# Patient Record
Sex: FEMALE | URBAN - METROPOLITAN AREA
[De-identification: names, ages, dates, MRNs, and addresses within clinical notes are randomized per-mention and may not be internally consistent; named-entity substitution may affect disease eponyms.]

---

## 2023-11-16 ENCOUNTER — INPATIENT (INPATIENT)
Facility: HOSPITAL | Age: 78
LOS: 5 days | Discharge: HOME | DRG: 517 | End: 2023-11-22
Attending: STUDENT IN AN ORGANIZED HEALTH CARE EDUCATION/TRAINING PROGRAM | Admitting: STUDENT IN AN ORGANIZED HEALTH CARE EDUCATION/TRAINING PROGRAM
Payer: COMMERCIAL

## 2023-11-16 VITALS
HEART RATE: 72 BPM | OXYGEN SATURATION: 99 % | WEIGHT: 115.08 LBS | DIASTOLIC BLOOD PRESSURE: 74 MMHG | RESPIRATION RATE: 18 BRPM | SYSTOLIC BLOOD PRESSURE: 133 MMHG | TEMPERATURE: 99 F | HEIGHT: 63 IN

## 2023-11-16 DIAGNOSIS — M31.6 OTHER GIANT CELL ARTERITIS: ICD-10-CM

## 2023-11-16 LAB
ANION GAP SERPL CALC-SCNC: 13 MMOL/L — SIGNIFICANT CHANGE UP (ref 5–17)
ANION GAP SERPL CALC-SCNC: 13 MMOL/L — SIGNIFICANT CHANGE UP (ref 5–17)
BASOPHILS # BLD AUTO: 0.03 K/UL — SIGNIFICANT CHANGE UP (ref 0–0.2)
BASOPHILS # BLD AUTO: 0.03 K/UL — SIGNIFICANT CHANGE UP (ref 0–0.2)
BASOPHILS NFR BLD AUTO: 0.4 % — SIGNIFICANT CHANGE UP (ref 0–2)
BASOPHILS NFR BLD AUTO: 0.4 % — SIGNIFICANT CHANGE UP (ref 0–2)
BUN SERPL-MCNC: 26 MG/DL — HIGH (ref 7–23)
BUN SERPL-MCNC: 26 MG/DL — HIGH (ref 7–23)
CALCIUM SERPL-MCNC: 10.1 MG/DL — SIGNIFICANT CHANGE UP (ref 8.4–10.5)
CALCIUM SERPL-MCNC: 10.1 MG/DL — SIGNIFICANT CHANGE UP (ref 8.4–10.5)
CHLORIDE SERPL-SCNC: 102 MMOL/L — SIGNIFICANT CHANGE UP (ref 96–108)
CHLORIDE SERPL-SCNC: 102 MMOL/L — SIGNIFICANT CHANGE UP (ref 96–108)
CO2 SERPL-SCNC: 24 MMOL/L — SIGNIFICANT CHANGE UP (ref 22–31)
CO2 SERPL-SCNC: 24 MMOL/L — SIGNIFICANT CHANGE UP (ref 22–31)
CREAT SERPL-MCNC: 0.85 MG/DL — SIGNIFICANT CHANGE UP (ref 0.5–1.3)
CREAT SERPL-MCNC: 0.85 MG/DL — SIGNIFICANT CHANGE UP (ref 0.5–1.3)
CRP SERPL-MCNC: 19 MG/L — HIGH (ref 0–4)
CRP SERPL-MCNC: 19 MG/L — HIGH (ref 0–4)
EGFR: 70 ML/MIN/1.73M2 — SIGNIFICANT CHANGE UP
EGFR: 70 ML/MIN/1.73M2 — SIGNIFICANT CHANGE UP
EOSINOPHIL # BLD AUTO: 0.09 K/UL — SIGNIFICANT CHANGE UP (ref 0–0.5)
EOSINOPHIL # BLD AUTO: 0.09 K/UL — SIGNIFICANT CHANGE UP (ref 0–0.5)
EOSINOPHIL NFR BLD AUTO: 1.2 % — SIGNIFICANT CHANGE UP (ref 0–6)
EOSINOPHIL NFR BLD AUTO: 1.2 % — SIGNIFICANT CHANGE UP (ref 0–6)
GLUCOSE SERPL-MCNC: 114 MG/DL — HIGH (ref 70–99)
GLUCOSE SERPL-MCNC: 114 MG/DL — HIGH (ref 70–99)
HCT VFR BLD CALC: 32.2 % — LOW (ref 34.5–45)
HCT VFR BLD CALC: 32.2 % — LOW (ref 34.5–45)
HGB BLD-MCNC: 10.3 G/DL — LOW (ref 11.5–15.5)
HGB BLD-MCNC: 10.3 G/DL — LOW (ref 11.5–15.5)
IMM GRANULOCYTES NFR BLD AUTO: 0.6 % — SIGNIFICANT CHANGE UP (ref 0–0.9)
IMM GRANULOCYTES NFR BLD AUTO: 0.6 % — SIGNIFICANT CHANGE UP (ref 0–0.9)
LYMPHOCYTES # BLD AUTO: 1.05 K/UL — SIGNIFICANT CHANGE UP (ref 1–3.3)
LYMPHOCYTES # BLD AUTO: 1.05 K/UL — SIGNIFICANT CHANGE UP (ref 1–3.3)
LYMPHOCYTES # BLD AUTO: 14.6 % — SIGNIFICANT CHANGE UP (ref 13–44)
LYMPHOCYTES # BLD AUTO: 14.6 % — SIGNIFICANT CHANGE UP (ref 13–44)
MAGNESIUM SERPL-MCNC: 1.8 MG/DL — SIGNIFICANT CHANGE UP (ref 1.6–2.6)
MAGNESIUM SERPL-MCNC: 1.8 MG/DL — SIGNIFICANT CHANGE UP (ref 1.6–2.6)
MCHC RBC-ENTMCNC: 30.9 PG — SIGNIFICANT CHANGE UP (ref 27–34)
MCHC RBC-ENTMCNC: 30.9 PG — SIGNIFICANT CHANGE UP (ref 27–34)
MCHC RBC-ENTMCNC: 32 GM/DL — SIGNIFICANT CHANGE UP (ref 32–36)
MCHC RBC-ENTMCNC: 32 GM/DL — SIGNIFICANT CHANGE UP (ref 32–36)
MCV RBC AUTO: 96.7 FL — SIGNIFICANT CHANGE UP (ref 80–100)
MCV RBC AUTO: 96.7 FL — SIGNIFICANT CHANGE UP (ref 80–100)
MONOCYTES # BLD AUTO: 0.55 K/UL — SIGNIFICANT CHANGE UP (ref 0–0.9)
MONOCYTES # BLD AUTO: 0.55 K/UL — SIGNIFICANT CHANGE UP (ref 0–0.9)
MONOCYTES NFR BLD AUTO: 7.6 % — SIGNIFICANT CHANGE UP (ref 2–14)
MONOCYTES NFR BLD AUTO: 7.6 % — SIGNIFICANT CHANGE UP (ref 2–14)
NEUTROPHILS # BLD AUTO: 5.45 K/UL — SIGNIFICANT CHANGE UP (ref 1.8–7.4)
NEUTROPHILS # BLD AUTO: 5.45 K/UL — SIGNIFICANT CHANGE UP (ref 1.8–7.4)
NEUTROPHILS NFR BLD AUTO: 75.6 % — SIGNIFICANT CHANGE UP (ref 43–77)
NEUTROPHILS NFR BLD AUTO: 75.6 % — SIGNIFICANT CHANGE UP (ref 43–77)
NRBC # BLD: 0 /100 WBCS — SIGNIFICANT CHANGE UP (ref 0–0)
NRBC # BLD: 0 /100 WBCS — SIGNIFICANT CHANGE UP (ref 0–0)
PLATELET # BLD AUTO: 308 K/UL — SIGNIFICANT CHANGE UP (ref 150–400)
PLATELET # BLD AUTO: 308 K/UL — SIGNIFICANT CHANGE UP (ref 150–400)
POTASSIUM SERPL-MCNC: 4.5 MMOL/L — SIGNIFICANT CHANGE UP (ref 3.5–5.3)
POTASSIUM SERPL-MCNC: 4.5 MMOL/L — SIGNIFICANT CHANGE UP (ref 3.5–5.3)
POTASSIUM SERPL-SCNC: 4.5 MMOL/L — SIGNIFICANT CHANGE UP (ref 3.5–5.3)
POTASSIUM SERPL-SCNC: 4.5 MMOL/L — SIGNIFICANT CHANGE UP (ref 3.5–5.3)
RBC # BLD: 3.33 M/UL — LOW (ref 3.8–5.2)
RBC # BLD: 3.33 M/UL — LOW (ref 3.8–5.2)
RBC # FLD: 15.4 % — HIGH (ref 10.3–14.5)
RBC # FLD: 15.4 % — HIGH (ref 10.3–14.5)
SODIUM SERPL-SCNC: 139 MMOL/L — SIGNIFICANT CHANGE UP (ref 135–145)
SODIUM SERPL-SCNC: 139 MMOL/L — SIGNIFICANT CHANGE UP (ref 135–145)
WBC # BLD: 7.21 K/UL — SIGNIFICANT CHANGE UP (ref 3.8–10.5)
WBC # BLD: 7.21 K/UL — SIGNIFICANT CHANGE UP (ref 3.8–10.5)
WBC # FLD AUTO: 7.21 K/UL — SIGNIFICANT CHANGE UP (ref 3.8–10.5)
WBC # FLD AUTO: 7.21 K/UL — SIGNIFICANT CHANGE UP (ref 3.8–10.5)

## 2023-11-16 PROCEDURE — 99285 EMERGENCY DEPT VISIT HI MDM: CPT

## 2023-11-16 RX ORDER — SODIUM CHLORIDE 9 MG/ML
500 INJECTION INTRAMUSCULAR; INTRAVENOUS; SUBCUTANEOUS ONCE
Refills: 0 | Status: COMPLETED | OUTPATIENT
Start: 2023-11-16 | End: 2023-11-16

## 2023-11-16 RX ADMIN — SODIUM CHLORIDE 500 MILLILITER(S): 9 INJECTION INTRAMUSCULAR; INTRAVENOUS; SUBCUTANEOUS at 21:33

## 2023-11-16 RX ADMIN — Medication 50 MILLIGRAM(S): at 21:33

## 2023-11-16 NOTE — ED PROVIDER NOTE - OBJECTIVE STATEMENT
HPI & ROS: 70-year-old female with a history of hypertension, hyperlipidemia coming in with weeks of not changing temporal bilateral headache.  Taking Tylenol and Motrin for this.  In the outpatient setting, her neurology physician was concern for GCA,  difficulties in scheduling a biopsy.  ESR and CRP resulted from blood work done at the primary care office with an ESR of 90 and a CRP of around 16, was sent here and neurology in-house apparently aware of the patient.  No fevers no chills.  Patient having arthralgias myalgias.  No cough no congestion.  No chest pain no shortness of breath.  Has been losing weight as well.    Neuro: orville jacobson

## 2023-11-16 NOTE — ED PROVIDER NOTE - PROGRESS NOTE DETAILS
NAYELI Jones PGY-2: Neurology will see patient.  Ophthalmology will see the patient after going to [facility] for possible operative intervention due to trauma there.  He will sign out the patient and they will get here soon as possible, likely after 10 PM.

## 2023-11-16 NOTE — ED PROVIDER NOTE - PHYSICAL EXAMINATION
Exam as stated below:   CONSTITUTIONAL: In NAD.   SKIN: Warm dry. No rashes noted.   EYES: No scleral icterus. Conjunctiva pink.  ENT:   Noted raised right temporal artery, pulsatile.  Tender to palpation.  Left temporal artery tender to palpation though not as noticeable as right.  CARD: RRR. No murmurs.  RESP: Clear to ausculation b/l. No Crackles noted. No Wheezing noted.  ABD: Soft. Non-tender. Not distended.   MSK: No pedal edema. No calf tenderness.  NEURO: UE/LE grossly intact. Motor UE/LE sensation grossly intact. CN II-XII grossly intact.   PSYCH: Cooperative, appropriate. Exam as stated below:   CONSTITUTIONAL: In NAD.   SKIN: Warm dry. No rashes noted.   EYES: No scleral icterus. Conjunctiva pink. L:20/40 R: 40/70   ENT:   Noted raised right temporal artery, pulsatile.  Tender to palpation.  Left temporal artery tender to palpation though not as noticeable as right.  CARD: RRR. No murmurs.  RESP: Clear to ausculation b/l. No Crackles noted. No Wheezing noted.  ABD: Soft. Non-tender. Not distended.   MSK: No pedal edema. No calf tenderness.  NEURO: UE/LE grossly intact. Motor UE/LE sensation grossly intact. CN II-XII grossly intact.   PSYCH: Cooperative, appropriate.

## 2023-11-16 NOTE — ED ADULT TRIAGE NOTE - CHIEF COMPLAINT QUOTE
seen by neurologist today; sent in by MD due to abnormal lab results of elevated ESR and CRP; c/o headaches and blurred vision since Monday; sent in by Neurologist for concern for impending vision loss

## 2023-11-16 NOTE — ED PROVIDER NOTE - NS ED MD DISPO ADMITTING SERVICE
Discussion/Summary   Will discuss with you at appt on 12/13/17.    LAD        Verified Results  TSH WITH REFLEX 28Nov2017 12:32PM MIRNA PARTIDA     Test Name Result Flag Reference   TSH 2.881 mcUnits/mL  0.350-5.000   Findings most consistent with euthyroid state, no additional testing suggested. TSH may be normal in patients with thyroid dysfunction and pituitary disease. Clinical correlation recommended.  (Reflex TSH algorithm is not recommended in hospitalized patients. A variety of drugs, as well as serious acute and chronic illnesses may alter thyroid function tests. Commonly implicated drugs include glucocorticoids, dopamine, carbamazepine, iodine, amiodarone, lithium and heparin.)       Message   CC:  Dr Bettye Rucker     
MED

## 2023-11-16 NOTE — ED PROVIDER NOTE - CARE PLAN
Principal Discharge DX:	GCA (giant cell arteritis)   1 Principal Discharge DX:	Blurred vision  Secondary Diagnosis:	Daily headache  Secondary Diagnosis:	Elevated sedimentation rate

## 2023-11-16 NOTE — CONSULT NOTE ADULT - SUBJECTIVE AND OBJECTIVE BOX
Neurology - Consult Note    -  Spectra: [phone] ([facility]), [phone] ([facility]). For new consults, please page: [phone] ([facility]), [phone] ([facility]).  -    HPI: Patient PATTI HARRIS is a 78y (1945) *** handed wo/man who presented to *** ED on ***, with c/o ***.    PMH significant for: ***    Review of Systems:  INCOMPLETE   All other review of systems is negative unless indicated above.    Allergies:  No Known Allergies      PMHx/PSHx/Family Hx: As above, otherwise see below       Social Hx:  No current use of tobacco, alcohol, or illicit drugs  Lives with ***    Medications:  MEDICATIONS  (STANDING):    MEDICATIONS  (PRN):      Vitals:  T(C): 37.2 (11-16-23 @ 19:43), Max: 37.2 (11-16-23 @ 19:43)  HR: 72 (11-16-23 @ 19:43) (72 - 72)  BP: 133/74 (11-16-23 @ 19:43) (133/74 - 133/74)  RR: 18 (11-16-23 @ 19:43) (18 - 18)  SpO2: 99% (11-16-23 @ 19:43) (99% - 99%)    Physical Examination: INCOMPLETE  General - Sitting up on ED cart  Cardiovascular - No LE edema  Eyes - Fundoscopy not performed due to safety precautions in the setting of infection risk, non-injected conjunctiva, anicteric sclera    Neurologic Exam:  Mental status:  - Awake, Alert  - Oriented to: person, place, and time  - Speech: fluent  - Repetition and naming: intact   - Follows simple and complex commands   - Attention/concentration: intact  - Recent and remote memory (including registration and recall): registration intact, 3/3 on 3-word recall  - Fund of knowledge: intact    Cranial nerves - PERRL, VFF on confrontational testing, EOMI - no nystagmus, face sensation (V1-V3) intact b/l, facial strength intact without asymmetry b/l, hearing intact b/l with finger rub test, palate with symmetric elevation, trapezius 5/5 strength b/l, tongue midline on protrusion with full lateral movement    Motor - Normal bulk and tone throughout. No pronator drift.  Strength testing (R/L)  Deltoid:  5/5  Biceps:  5/5        Triceps:  5/5       Wrist Extension:  5/5      Wrist Flexion:  5/5       Interossei:  5/5        :  5/5    Hip Flexion:  5/5  Hip Extension:  5/5      Knee Flexion:  5/5      Knee Extension:  5/5      Dorsiflexion:  5/5      Plantar Flexion:  5/5    Sensation - Light touch/vibration intact throughout    DTRs (R/L)  Biceps:  2+/2+        Triceps:  2+/2+       Brachioradialis:  2+/2+        Patellar:  2+/2+      Ankle:  2+/2+      Plantar response:  Down/Down    Coordination - Finger to Nose intact b/l. No tremors appreciated.    Gait and station - Did not assess d/t fall risk/safety concerns.    Labs:          CAPILLARY BLOOD GLUCOSE              CSF:                  Radiology:     Neurology - Consult Note    -  Spectra: [phone] ([facility]), [phone] ([facility]). For new consults, please page: [phone] ([facility]), [phone] ([facility]).  -    HPI: Patient PATTI HARRIS is a 78y (1945) RIGHT handed woman who presented to [facility] ED on 11/16/2023, at the behest of her neurologist, Dr. Fallon, with c/o [place] and blurred vision x 1 month.    PMH significant for: cervical cancer (s/p hysterectomy), HTN, HLD    Patient speaks Georgian. Daughter, bedside, is translating. Patient is a very poor historian and struggles to answer questions - even when asked in Georgian. Daughter reports that patient has been confused for 6 to 12 months. She has c/f dementia.    Patient has been having a headache that involves the crown of her head as well as the temples for 1 month.  Patient has also been complaining of blurry vision in both of her eyes, with the right eye being worse than left eye.  Per daughter patient was seeing Dr. Fallon as an outpatient.  She was prescribed prednisone, but was unable to tolerate it due to gastric side effects.  Daughter reports that patient's symptoms have largely subsided, however her inflammatory markers continue to rise, while she has been off the prednisone.  She also reports the patient has had a poor appetite and is losing weight.  Patient took her prednisone for about 10 days before discontinuing.  Patient does not drink a significant amount of caffeine.  Daughter reports she drinks maybe 1 cup of coffee a day.  Daughter denies that patient has had any head trauma.  Patient does not have a history of ocular issues other than cataract surgery.    Patient reports that she has never had these symptoms before.  She does have a history of cervical cancer which was treated with hysterectomy.  She also has a history of hypertension for which she takes losartan.  Patient also has a history of hyperlipidemia.  Patient denies a history of stroke or MI.  Patient does not take any blood thinners and is unable to tolerate aspirin due to her hiatal hernia.  Patient denies use of assistive devices, is able to climb stairs, does not drive.  Patient denies use of tobacco, alcohol, recreational drugs.  Patient currently lives alone.  Patient is a former hairdresser.  Patient takes famotidine for her GERD.  She does not take it every day.    I had originally suggested to the emergency department provider to order CT imaging in the emergency room to rule out any acute intracranial pathology.  However, when discussing additional CT scans in the emergency department with patient and her daughter, patient declines any further imaging in the emergency room.  Patient's daughter reports that when patient's symptoms were at their worst patient underwent both CT scans and an MRI.  Both were reportedly unremarkable.  Patient daughter does show me her MRI report from [facility].  MRI is only notable for chronic microvascular disease.    Review of Systems:  All other review of systems is negative unless indicated above.    Allergies:  No Known Allergies      PMHx/PSHx/Family Hx: As above, otherwise see below       Social Hx:  Per HPI    Medications:  MEDICATIONS  (STANDING):    MEDICATIONS  (PRN):      Vitals:  T(C): 37.2 (11-16-23 @ 19:43), Max: 37.2 (11-16-23 @ 19:43)  HR: 72 (11-16-23 @ 19:43) (72 - 72)  BP: 133/74 (11-16-23 @ 19:43) (133/74 - 133/74)  RR: 18 (11-16-23 @ 19:43) (18 - 18)  SpO2: 99% (11-16-23 @ 19:43) (99% - 99%)    Physical Examination:  General - Lying supine on ED cart  Cardiovascular - No LE edema, symmetrical temporal pulses on palpation  Eyes - Fundoscopy was performed by opthalmology prior to my examination    Neurologic Exam:  Mental status:  - Awake, Alert  - Oriented to: person. Knows she is in the hospital but does not know which. Knows it is November 2023. Guesses correctly that the date is the 17th.  - Speech: fluent  - Repetition and naming: intact   - Follows simple and complex commands   - Attention/concentration: Declines to spell WORLD forward or backward (even in Georgian)  - Recent and remote memory (including registration and recall): registration intact, 3/3 on 3-word recall  - Fund of knowledge: impaired - does not know the president of the [place], does know [place] conflict in the news    Cranial nerves - Pupils are pharmacologically dilated, VFF on confrontational testing, EOMI - no nystagmus, face sensation (V1-V3) intact b/l, facial strength intact without asymmetry b/l, hearing intact b/l with finger rub test, palate with symmetric elevation, trapezius 5/5 strength b/l, tongue midline on protrusion with full lateral movement    Motor - Normal bulk and tone throughout. No pronator drift.  Strength testing (R/L)  Deltoid:  5/5  Biceps:  5/5        Triceps:  5/5       Wrist Extension:  5/5      Wrist Flexion:  5/5       Interossei:  5/5        :  5/5    Hip Flexion:  5/5  Hip Extension:  5/5      Knee Flexion:  5/5      Knee Extension:  5/5      Dorsiflexion:  5/5      Plantar Flexion:  5/5    Sensation - Light touch/vibration grossly intact throughout    DTRs (R/L)  Biceps:  2+/2+        Triceps:  2+/2+       Brachioradialis:  2+/2+        Patellar:  2+/2+      Ankle:  2+/2+      Plantar response:  TAVO - patient with shoes on, not able to easily remove    Coordination - Finger to Nose intact b/l. No tremors appreciated.    Gait and station - Did not assess d/t fall risk/safety concerns.    Labs:          CAPILLARY BLOOD GLUCOSE              CSF:                  Radiology:  Patient declined scans in the ED.

## 2023-11-16 NOTE — ED PROVIDER NOTE - CLINICAL SUMMARY MEDICAL DECISION MAKING FREE TEXT BOX
MDM/Summary/DDx (includes but is not limited to):   This is a 70-year-old female with a history of temporal bilateral headache, with concern for GCA, found to have a pulsatile right  tender to palpation artery.  With an ESR in the office of over 50, diagnosis could be GCA versus other vasculitis though nothing found on my cardiac exam.  Labs: cbc bmp esr crp   Imaging: none   Tx: Supportive, pain/nausea medications as pt requires/requests. iv steroids   Consults/Resources: [id number] (aware at 2110)  Dispo: admit     Triage note reviewed. VS reviewed. EKG reviewed and documented in "RESULTS" section, if possible at given time.     DDx in MDM includes the most likely ddx, but is not limited to solely what is listed. Clinical course may alter/deviate from the above plan. When possible, progress notes written, as needed, and are included in "PROGRESS NOTE" section below.       Medical, family, and social determinants of health reviewed and discussed w/ pt/family/caretaker, when allowable, and is incorporated into note above, whenever possible.

## 2023-11-16 NOTE — ED PROVIDER NOTE - ATTENDING CONTRIBUTION TO CARE
Attending MD Fernandes:  I personally have seen and examined this patient. I have performed a substantive portion of the visit including all aspects of the medical decision making.  Resident note reviewed and agree on plan of care and except where noted.      78-year-old woman presenting at the recommendation of her neurologist for evaluation of 1 month of global headache pain and now several days of bilateral blurred vision.  Reportedly elevated ESR/CRP of 90 and 16 as outpatient.  Patient reports being on prednisone 3 to 4 weeks prior for 10 days however she cannot tolerate it due to abdominal discomfort.  Denies any jaw claudication, no fevers or chills.  Did admit to 5 to 10 pound weight loss.  No rash no abdominal pain no chest pain or shortness of breath.    Vital signs are nonactionable.  Awake and alert. Symmetric eyebrow raise, symmetric eyelid closure. PERRL b/l, EOMI b/l, symmetric smile, tongue midline.  5/5  strength bilaterally, 5/5 elbow flexion bilaterally, 5/5 elbow extension bilaterally, 5/5 shoulder shrug b/l.  5/5 plantar and dorsiflexion b/l, 5/5 knee flexion and extension b/l, 5/5 hip flexion and extension b/l. Sensation intact and symmetric grossly to light touch throughout face and bilateral upper and lower extremities,  Finger to nose normal bilaterally. Steady gait.  Bilateral temporal tenderness.  Visual acuity 20/50 OD, 20/70 OS    Patient presenting for evaluation of 1 month of headache pain and now several days of blurred vision with reportedly elevated ESR/CRP.  Symptom complex concerning for GCA, given visual changes will initiate pulse dose steroids, neurology and ophthalmology are consulted and will see patient within the hospital.  Patient had an MRI of the brain on 10/27 with report provided to me that did not show any structural lesions bleeding or any other acute findings.  Repeat neuroimaging would not be of benefit in this patient unless neurology recommends       *The above represents an initial assessment/impression. Please refer to progress notes for potential changes in patient clinical course*

## 2023-11-16 NOTE — ED ADULT NURSE NOTE - OBJECTIVE STATEMENT
77 yo F pt P/W a few months of HA, fatigue and decreased appetite and blurry vision over the past 3 days. pt sent by her Neurologist as her ESR and CRP were elevated.  pt is A&Ox4, MAEW, no unilateral drift/weakness, no facial droop, PERRL, no ataxia, gait grossly normal.  Pt denies: dizziness, chest pain, palpitations, cough, SOB, abdominal pain, n/v/d, urinary symptoms, fevers, chills, weakness at this time.

## 2023-11-16 NOTE — CONSULT NOTE ADULT - ASSESSMENT
#Headache  #Blurred Vision  #Elevated ESR, CRP    Vitals notable for: /74, HR 72, RR 18, SpO2 99% on RA. Labs notable for: ***. Exam notable for ***.     Impression: GCA by history    Recommendations:  [] Draw ESR, CRP in the ED  [] Administer methylprednisolone 1000mg IV in the ED, continue x 3 days, then discharge on prednisone 1 mg/kg/day (unless Rheumatology says otherwise)  [] Admit to Medicine  [] Please consult Rheumatology, Ophthalmology - appreciate evaluation and recommendations  [] May require temporal artery biopsy - given steroid use, may return nondiagnostic    To be seen by attending in the AM with attestation to follow. Plan is not formalized until attending attestation is complete. Recommendations were relayed directly to ***. Note delayed d/t emergent patient care. #Headache - b/l temporal and crown  #Blurred Vision  #Elevated ESR, CRP  #Glaucoma   #Macular Degeneration    Vitals notable for: /74, HR 72, RR 18, SpO2 99% on RA. Labs notable for: ***. Exam notable for ***.     Impression: GCA by history    Recommendations:  [] Draw ESR, CRP in the ED  [] Patient declined CT imaging in the ED (NCCTH, CTA, CTV). Daughter reports she had CT and MRI when she had her worst symptoms and they returned unremarkable. MRI report reviewed - chronic microvascular disease  [] Administer methylprednisolone 1000mg IV in the ED, continue x 3 days, then discharge on prednisone 1 mg/kg/day (unless Rheumatology says otherwise - patient apparently not able to tolerate prednisone d/t upset stomach)  [] Please start pantoprazole 40mg daily while patient on steroids - intermittently takes famotidine for GERD, sensitive stomach  [] Admit to Medicine  [] Please consult Rheumatology, Ophthalmology - appreciate evaluation and recommendations  [] May require temporal artery biopsy - given steroid use, may return nondiagnostic    To be seen by attending in the AM with attestation to follow. Plan is not formalized until attending attestation is complete. Recommendations were relayed directly to ***. Note delayed d/t emergent patient care. #Headache - b/l temporal and crown  #Blurred Vision  #Elevated ESR, CRP  #Glaucoma   #Macular Degeneration  #Suspected Cognitive Impairment    Vitals notable for: /74, HR 72, RR 18, SpO2 99% on RA. Labs notable for: ESR 75, CRP 19, Hg 10.3, BUN 26, glucose 114. Exam notable for poor STM (0/3 on 3-word recall, even with clues).     Impression: GCA by history    Recommendations:  [] Draw ESR, CRP in the ED  [] Patient declined CT imaging in the ED (NCCTH, CTA, CTV). Daughter reports she had CT and MRI when she had her worst symptoms and they returned unremarkable. MRI report from [facility] reviewed - chronic microvascular disease.  [] Administer methylprednisolone 1000mg IV in the ED, continue x 3 days, then discharge on prednisone 1 mg/kg/day (unless Rheumatology says otherwise - patient apparently not able to tolerate prednisone d/t upset stomach)  [] Please start pantoprazole 40mg daily while patient on steroids - intermittently takes famotidine for GERD, sensitive stomach  [] Admit to Medicine  [] Please consult Rheumatology, Ophthalmology - appreciate evaluation and recommendations  [] May require temporal artery biopsy (defer to Rheumatology) - given steroid use, may return nondiagnostic    To be seen by attending in the AM with attestation to follow. Plan is not formalized until attending attestation is complete. Note delayed d/t emergent patient care.

## 2023-11-17 DIAGNOSIS — I10 ESSENTIAL (PRIMARY) HYPERTENSION: ICD-10-CM

## 2023-11-17 DIAGNOSIS — E78.5 HYPERLIPIDEMIA, UNSPECIFIED: ICD-10-CM

## 2023-11-17 DIAGNOSIS — Z29.9 ENCOUNTER FOR PROPHYLACTIC MEASURES, UNSPECIFIED: ICD-10-CM

## 2023-11-17 DIAGNOSIS — H35.30 UNSPECIFIED MACULAR DEGENERATION: ICD-10-CM

## 2023-11-17 DIAGNOSIS — M31.6 OTHER GIANT CELL ARTERITIS: ICD-10-CM

## 2023-11-17 DIAGNOSIS — H40.9 UNSPECIFIED GLAUCOMA: ICD-10-CM

## 2023-11-17 LAB
ALBUMIN SERPL ELPH-MCNC: 3.5 G/DL — SIGNIFICANT CHANGE UP (ref 3.3–5)
ALBUMIN SERPL ELPH-MCNC: 3.5 G/DL — SIGNIFICANT CHANGE UP (ref 3.3–5)
ALP SERPL-CCNC: 89 U/L — SIGNIFICANT CHANGE UP (ref 40–120)
ALP SERPL-CCNC: 89 U/L — SIGNIFICANT CHANGE UP (ref 40–120)
ALT FLD-CCNC: 9 U/L — LOW (ref 10–45)
ALT FLD-CCNC: 9 U/L — LOW (ref 10–45)
ANION GAP SERPL CALC-SCNC: 16 MMOL/L — SIGNIFICANT CHANGE UP (ref 5–17)
ANION GAP SERPL CALC-SCNC: 16 MMOL/L — SIGNIFICANT CHANGE UP (ref 5–17)
AST SERPL-CCNC: 18 U/L — SIGNIFICANT CHANGE UP (ref 10–40)
AST SERPL-CCNC: 18 U/L — SIGNIFICANT CHANGE UP (ref 10–40)
BILIRUB SERPL-MCNC: 0.4 MG/DL — SIGNIFICANT CHANGE UP (ref 0.2–1.2)
BILIRUB SERPL-MCNC: 0.4 MG/DL — SIGNIFICANT CHANGE UP (ref 0.2–1.2)
BUN SERPL-MCNC: 24 MG/DL — HIGH (ref 7–23)
BUN SERPL-MCNC: 24 MG/DL — HIGH (ref 7–23)
CALCIUM SERPL-MCNC: 9.7 MG/DL — SIGNIFICANT CHANGE UP (ref 8.4–10.5)
CALCIUM SERPL-MCNC: 9.7 MG/DL — SIGNIFICANT CHANGE UP (ref 8.4–10.5)
CHLORIDE SERPL-SCNC: 104 MMOL/L — SIGNIFICANT CHANGE UP (ref 96–108)
CHLORIDE SERPL-SCNC: 104 MMOL/L — SIGNIFICANT CHANGE UP (ref 96–108)
CO2 SERPL-SCNC: 21 MMOL/L — LOW (ref 22–31)
CO2 SERPL-SCNC: 21 MMOL/L — LOW (ref 22–31)
CREAT SERPL-MCNC: 0.81 MG/DL — SIGNIFICANT CHANGE UP (ref 0.5–1.3)
CREAT SERPL-MCNC: 0.81 MG/DL — SIGNIFICANT CHANGE UP (ref 0.5–1.3)
EGFR: 74 ML/MIN/1.73M2 — SIGNIFICANT CHANGE UP
EGFR: 74 ML/MIN/1.73M2 — SIGNIFICANT CHANGE UP
ERYTHROCYTE [SEDIMENTATION RATE] IN BLOOD: 75 MM/HR — HIGH (ref 0–20)
ERYTHROCYTE [SEDIMENTATION RATE] IN BLOOD: 75 MM/HR — HIGH (ref 0–20)
GLUCOSE SERPL-MCNC: 176 MG/DL — HIGH (ref 70–99)
GLUCOSE SERPL-MCNC: 176 MG/DL — HIGH (ref 70–99)
HCT VFR BLD CALC: 30.4 % — LOW (ref 34.5–45)
HCT VFR BLD CALC: 30.4 % — LOW (ref 34.5–45)
HCV AB S/CO SERPL IA: 0.04 S/CO — SIGNIFICANT CHANGE UP (ref 0–0.99)
HCV AB S/CO SERPL IA: 0.04 S/CO — SIGNIFICANT CHANGE UP (ref 0–0.99)
HCV AB SERPL-IMP: SIGNIFICANT CHANGE UP
HCV AB SERPL-IMP: SIGNIFICANT CHANGE UP
HGB BLD-MCNC: 10 G/DL — LOW (ref 11.5–15.5)
HGB BLD-MCNC: 10 G/DL — LOW (ref 11.5–15.5)
MAGNESIUM SERPL-MCNC: 1.7 MG/DL — SIGNIFICANT CHANGE UP (ref 1.6–2.6)
MAGNESIUM SERPL-MCNC: 1.7 MG/DL — SIGNIFICANT CHANGE UP (ref 1.6–2.6)
MCHC RBC-ENTMCNC: 31.3 PG — SIGNIFICANT CHANGE UP (ref 27–34)
MCHC RBC-ENTMCNC: 31.3 PG — SIGNIFICANT CHANGE UP (ref 27–34)
MCHC RBC-ENTMCNC: 32.9 GM/DL — SIGNIFICANT CHANGE UP (ref 32–36)
MCHC RBC-ENTMCNC: 32.9 GM/DL — SIGNIFICANT CHANGE UP (ref 32–36)
MCV RBC AUTO: 95 FL — SIGNIFICANT CHANGE UP (ref 80–100)
MCV RBC AUTO: 95 FL — SIGNIFICANT CHANGE UP (ref 80–100)
NRBC # BLD: 0 /100 WBCS — SIGNIFICANT CHANGE UP (ref 0–0)
NRBC # BLD: 0 /100 WBCS — SIGNIFICANT CHANGE UP (ref 0–0)
PHOSPHATE SERPL-MCNC: 3.9 MG/DL — SIGNIFICANT CHANGE UP (ref 2.5–4.5)
PHOSPHATE SERPL-MCNC: 3.9 MG/DL — SIGNIFICANT CHANGE UP (ref 2.5–4.5)
PLATELET # BLD AUTO: 309 K/UL — SIGNIFICANT CHANGE UP (ref 150–400)
PLATELET # BLD AUTO: 309 K/UL — SIGNIFICANT CHANGE UP (ref 150–400)
POTASSIUM SERPL-MCNC: 4.7 MMOL/L — SIGNIFICANT CHANGE UP (ref 3.5–5.3)
POTASSIUM SERPL-MCNC: 4.7 MMOL/L — SIGNIFICANT CHANGE UP (ref 3.5–5.3)
POTASSIUM SERPL-SCNC: 4.7 MMOL/L — SIGNIFICANT CHANGE UP (ref 3.5–5.3)
POTASSIUM SERPL-SCNC: 4.7 MMOL/L — SIGNIFICANT CHANGE UP (ref 3.5–5.3)
PROT SERPL-MCNC: 6.5 G/DL — SIGNIFICANT CHANGE UP (ref 6–8.3)
PROT SERPL-MCNC: 6.5 G/DL — SIGNIFICANT CHANGE UP (ref 6–8.3)
RBC # BLD: 3.2 M/UL — LOW (ref 3.8–5.2)
RBC # BLD: 3.2 M/UL — LOW (ref 3.8–5.2)
RBC # FLD: 15.2 % — HIGH (ref 10.3–14.5)
RBC # FLD: 15.2 % — HIGH (ref 10.3–14.5)
SODIUM SERPL-SCNC: 141 MMOL/L — SIGNIFICANT CHANGE UP (ref 135–145)
SODIUM SERPL-SCNC: 141 MMOL/L — SIGNIFICANT CHANGE UP (ref 135–145)
WBC # BLD: 9.01 K/UL — SIGNIFICANT CHANGE UP (ref 3.8–10.5)
WBC # BLD: 9.01 K/UL — SIGNIFICANT CHANGE UP (ref 3.8–10.5)
WBC # FLD AUTO: 9.01 K/UL — SIGNIFICANT CHANGE UP (ref 3.8–10.5)
WBC # FLD AUTO: 9.01 K/UL — SIGNIFICANT CHANGE UP (ref 3.8–10.5)

## 2023-11-17 PROCEDURE — 99223 1ST HOSP IP/OBS HIGH 75: CPT | Mod: GC

## 2023-11-17 PROCEDURE — 99222 1ST HOSP IP/OBS MODERATE 55: CPT | Mod: GC

## 2023-11-17 PROCEDURE — 99233 SBSQ HOSP IP/OBS HIGH 50: CPT

## 2023-11-17 RX ORDER — LATANOPROST 0.05 MG/ML
1 SOLUTION/ DROPS OPHTHALMIC; TOPICAL AT BEDTIME
Refills: 0 | Status: DISCONTINUED | OUTPATIENT
Start: 2023-11-17 | End: 2023-11-20

## 2023-11-17 RX ORDER — ENOXAPARIN SODIUM 100 MG/ML
40 INJECTION SUBCUTANEOUS EVERY 24 HOURS
Refills: 0 | Status: DISCONTINUED | OUTPATIENT
Start: 2023-11-17 | End: 2023-11-20

## 2023-11-17 RX ORDER — LANOLIN ALCOHOL/MO/W.PET/CERES
3 CREAM (GRAM) TOPICAL AT BEDTIME
Refills: 0 | Status: DISCONTINUED | OUTPATIENT
Start: 2023-11-17 | End: 2023-11-20

## 2023-11-17 RX ORDER — ONDANSETRON 8 MG/1
4 TABLET, FILM COATED ORAL EVERY 8 HOURS
Refills: 0 | Status: DISCONTINUED | OUTPATIENT
Start: 2023-11-17 | End: 2023-11-20

## 2023-11-17 RX ORDER — ACETAMINOPHEN 500 MG
650 TABLET ORAL EVERY 6 HOURS
Refills: 0 | Status: DISCONTINUED | OUTPATIENT
Start: 2023-11-17 | End: 2023-11-20

## 2023-11-17 RX ORDER — PANTOPRAZOLE SODIUM 20 MG/1
40 TABLET, DELAYED RELEASE ORAL
Refills: 0 | Status: DISCONTINUED | OUTPATIENT
Start: 2023-11-17 | End: 2023-11-20

## 2023-11-17 RX ADMIN — PANTOPRAZOLE SODIUM 40 MILLIGRAM(S): 20 TABLET, DELAYED RELEASE ORAL at 06:48

## 2023-11-17 RX ADMIN — Medication 50 MILLIGRAM(S): at 22:05

## 2023-11-17 RX ADMIN — ENOXAPARIN SODIUM 40 MILLIGRAM(S): 100 INJECTION SUBCUTANEOUS at 06:48

## 2023-11-17 RX ADMIN — LATANOPROST 1 DROP(S): 0.05 SOLUTION/ DROPS OPHTHALMIC; TOPICAL at 22:10

## 2023-11-17 NOTE — DIETITIAN INITIAL EVALUATION ADULT - REASON INDICATOR FOR ASSESSMENT
Consult received for MST score 2 or >   Information obtained from pt, electronic medical record  Fijian speaking per chart, but pt was speaking English as well   Chart reviewed, events noted

## 2023-11-17 NOTE — H&P ADULT - HISTORY OF PRESENT ILLNESS
77 y/o female with PMH of HTN, HLD presenting with "weeks" of bilateral temporal headache. She notes the headache is constant but waxes and wanes in intensity. The headache sometimes radiates to the back of her head. She denies any recent nausea, vomiting, photophobia, phonophobia, changes in sensation, weakness, fevers, chills, shortness of breath, or chest pain.     Of note, patient was evaluated in the outpatient setting for her headache. Due to concern for GCA, ESR and CRP were sent and found to be elevated (ESR:90, CRP: 16). Due to difficulties scheduling outpatient biopsy and patient noting vision which was slightly more blurry than usual, she was sent to ED.    In ED, patient was vitally stable with labs significant for CRP of 19 and ESR of 75. She was given 1g of methylprednisolone in the ED.

## 2023-11-17 NOTE — DIETITIAN INITIAL EVALUATION ADULT - NSFNSGIIOFT_GEN_A_CORE
- Pt denies nausea, vomiting, diarrhea, or constipation at this time   - Last BM:11/17; not currently ordered for bowel regimen

## 2023-11-17 NOTE — H&P ADULT - PROBLEM SELECTOR PLAN 1
-Pt with weeks of temporal bilateral headache  -Pt with slight blurred vision recently  -CRP of 19, ESR of 75  -s/p 1g of solumedrol in ED  -Pt evaluated by ophthalmology and found not to have any disc edema or ophthalmologic manifestations of GCA on exam  -Pt declined CT, MRI in ED. Daughter reports she had these done recently and they were unremarkable  Plan:  -Methylprednisolone 1000mg IV for 3 days total, then dc on prednisone 1mg/kg/day   -Pantoprazole 40mg daily for ppx on steroids  -Rheumatology consult in AM  -Per neurology, pt may require temporal artery biopsy. Will f/u with neuro in AM

## 2023-11-17 NOTE — DIETITIAN INITIAL EVALUATION ADULT - ORAL INTAKE PTA/DIET HISTORY
Pt endorses fair appetite and PO intake PTA.   Reports not following specific diet/ diet restrictions PTA and confirmed no known food allergies/ food intolerances

## 2023-11-17 NOTE — CONSULT NOTE ADULT - ASSESSMENT
78y female with a past medical history/ocular history of htn, hld, glaucoma, cataract surgery, AMD consulted for r/o ocular involvement of GCA.    #High concern for GCA  - Pt has had weeks of temporal bilateral headache.  - her neurology physician was concerned for GCA,  difficulties in scheduling a biopsy. Pt having sx of weight loss, fatigue, possible scalp tenderness?  - ESR and CRP resulted from blood work done at the primary care office with an ESR of 90 and a CRP of around 16  - CRP in ED was 19, ESR pending  - s/p 1g IV solumedrol in the ED  - No disc edema seen on exam, no ophthalmic manifestations of GCA on exam  - Recommend neurology consult  - Recommend rheum consult    #Glaucoma  - On exam IOP elevated in the right eye to 23, 18 in the left along with cupped nerved  - Pt is on a prostaglandin gtt outpt, does not remember which one  - Can start pt on latanoprost qhs to both eyes given significant cupping and elevated IOP    #Macular degeneration  - follow up outpatient    Discussed with Dr. Mcleod.    Outpatient Follow-up: Patient should follow-up with his/her ophthalmologist or with [facility] Department of Ophthalmology within 1 week of after discharge at:    [address]  [place] [address]  [phone]    Mansoor Morales MD, PGY-2  Also available on [employer] Teams

## 2023-11-17 NOTE — PROGRESS NOTE ADULT - ATTENDING COMMENTS
78y female with a past medical history/ocular history of HTN, HLD, glaucoma, cataract surgery, AMD consulted for r/o ocular involvement of GCA, found to have no ocular evidence of GCA. Pt with elevated ESR and CRP concerning for GCA. Continue IV steroids. Recommend temporal artery biopsy. Stressed importance. Appreciate neurology and rheumatology recs. Continue latanoprost for her glaucoma.

## 2023-11-17 NOTE — CONSULT NOTE ADULT - SUBJECTIVE AND OBJECTIVE BOX
Reason for consult: anemia    HPI:  79 y/o female with PMH of HTN, HLD presenting with "weeks" of bilateral temporal headache. She notes the headache is constant but waxes and wanes in intensity. The headache sometimes radiates to the back of her head. She denies any recent nausea, vomiting, photophobia, phonophobia, changes in sensation, weakness, fevers, chills, shortness of breath, or chest pain.     Of note, patient was evaluated in the outpatient setting for her headache. Due to concern for GCA, ESR and CRP were sent and found to be elevated (ESR:90, CRP: 16). Due to difficulties scheduling outpatient biopsy and patient noting vision which was slightly more blurry than usual, she was sent to ED.    In ED, patient was vitally stable with labs significant for CRP of 19 and ESR of 75. She was given 1g of methylprednisolone in the ED. (17 Nov 2023 03:50)    Hematology/Oncology called to see patient who follows with Dr Dos Santos of [facility] for the management and surveillance of anemia    PAST MEDICAL & SURGICAL HISTORY:      FAMILY HISTORY:      Alochol: Denied  Smoking: Nonsmoker  Drug Use: Denied  Marital Status:         Allergies    No Known Allergies    Intolerances        MEDICATIONS  (STANDING):  enoxaparin Injectable 40 milliGRAM(s) SubCutaneous every 24 hours  latanoprost 0.005% Ophthalmic Solution 1 Drop(s) Both EYES at bedtime  methylPREDNISolone sodium succinate IVPB 1000 milliGRAM(s) IV Intermittent daily  pantoprazole    Tablet 40 milliGRAM(s) Oral before breakfast    MEDICATIONS  (PRN):  acetaminophen     Tablet .. 650 milliGRAM(s) Oral every 6 hours PRN Temp greater or equal to 38C (100.4F), Mild Pain (1 - 3)  aluminum hydroxide/magnesium hydroxide/simethicone Suspension 30 milliLiter(s) Oral every 4 hours PRN Dyspepsia  melatonin 3 milliGRAM(s) Oral at bedtime PRN Insomnia  ondansetron Injectable 4 milliGRAM(s) IV Push every 8 hours PRN Nausea and/or Vomiting      ROS  No fever, sweats, chills  No epistaxis, HA, sore throat  No CP, SOB, cough, sputum  No n/v/d, abd pain, melena, hematochezia  No edema  No rash  No anxiety  No back pain, joint pain  No bleeding, bruising  No dysuria, hematuria    T(C): 36.4 (11-17-23 @ 12:53), Max: 37.2 (11-16-23 @ 19:43)  HR: 69 (11-17-23 @ 12:53) (69 - 81)  BP: 115/70 (11-17-23 @ 12:53) (97/58 - 149/63)  RR: 18 (11-17-23 @ 12:53) (17 - 20)  SpO2: 100% (11-17-23 @ 12:53) (97% - 100%)  Wt(kg): --    PE  frail  Awake, alert  Anicteric, MMM  RRR  CTAB  Abd soft, NT, ND  No c/c/e  No rash grossly  FROM                          10.0   9.01  )-----------( 309      ( 17 Nov 2023 07:01 )             30.4       11-17    141  |  104  |  24<H>  ----------------------------<  176<H>  4.7   |  21<L>  |  0.81    Ca    9.7      17 Nov 2023 07:01  Phos  3.9     11-17  Mg     1.7     11-17    TPro  6.5  /  Alb  3.5  /  TBili  0.4  /  DBili  x   /  AST  18  /  ALT  9<L>  /  AlkPhos  89  11-17

## 2023-11-17 NOTE — DIETITIAN INITIAL EVALUATION ADULT - OTHER INFO
Pt states UBW was ~140 years ago and ~117 pounds now  Dosing wt 115.1 pounds  100% IBW ( pounds)

## 2023-11-17 NOTE — CONSULT NOTE ADULT - SUBJECTIVE AND OBJECTIVE BOX
X Size Of Lesion In Cm: 0.4 Type Of Destruction Used: Curettage Destruction After The Procedure: No ***consult has been received. note is in progress and incomplete without attending attestation***    Darnell Santoyo MD  PGY-4  Rheumatology Fellow  Reachable on TEAMS  [phone]    PATTI HARRIS  [id number]    HISTORY OF PRESENT ILLNESS:      MEDICATIONS  (STANDING):  enoxaparin Injectable 40 milliGRAM(s) SubCutaneous every 24 hours  latanoprost 0.005% Ophthalmic Solution 1 Drop(s) Both EYES at bedtime  methylPREDNISolone sodium succinate IVPB 1000 milliGRAM(s) IV Intermittent daily  pantoprazole    Tablet 40 milliGRAM(s) Oral before breakfast    MEDICATIONS  (PRN):  acetaminophen     Tablet .. 650 milliGRAM(s) Oral every 6 hours PRN Temp greater or equal to 38C (100.4F), Mild Pain (1 - 3)  aluminum hydroxide/magnesium hydroxide/simethicone Suspension 30 milliLiter(s) Oral every 4 hours PRN Dyspepsia  melatonin 3 milliGRAM(s) Oral at bedtime PRN Insomnia  ondansetron Injectable 4 milliGRAM(s) IV Push every 8 hours PRN Nausea and/or Vomiting      Allergies    No Known Allergies    Intolerances        PERTINENT MEDICATION HISTORY:      Social History:      PAST MEDICAL & SURGICAL HISTORY:      OCCUPATION:  TRAVEL HISTORY:    FAMILY HISTORY:      Vital Signs Last 24 Hrs  T(C): 36.8 (17 Nov 2023 05:05), Max: 37.2 (16 Nov 2023 19:43)  T(F): 98.2 (17 Nov 2023 05:05), Max: 98.9 (16 Nov 2023 19:43)  HR: 69 (17 Nov 2023 05:05) (69 - 81)  BP: 97/58 (17 Nov 2023 05:05) (97/58 - 149/63)  BP(mean): --  RR: 20 (17 Nov 2023 05:05) (17 - 20)  SpO2: 98% (17 Nov 2023 05:05) (97% - 100%)    Parameters below as of 17 Nov 2023 05:05  Patient On (Oxygen Delivery Method): room air        Physical Exam:  General: No apparent distress  HEENT: EOMI, MMM  CVS: +S1/S2, RRR, no murmurs/rubs/gallops  Resp: CTA b/l. No crackles/wheezing  GI: Soft, NT/ND +BS  MSK: no swelling/warmth/erythema of the joints of the UE/LE  Neuro: AAOx3  Skin: no visible rashes    LABS:                        10.0   9.01  )-----------( 309      ( 17 Nov 2023 07:01 )             30.4     11-17    141  |  104  |  24<H>  ----------------------------<  176<H>  4.7   |  21<L>  |  0.81    Ca    9.7      17 Nov 2023 07:01  Phos  3.9     11-17  Mg     1.7     11-17    TPro  6.5  /  Alb  3.5  /  TBili  0.4  /  DBili  x   /  AST  18  /  ALT  9<L>  /  AlkPhos  89  11-17      Urinalysis Basic - ( 17 Nov 2023 07:01 )    Color: x / Appearance: x / SG: x / pH: x  Gluc: 176 mg/dL / Ketone: x  / Bili: x / Urobili: x   Blood: x / Protein: x / Nitrite: x   Leuk Esterase: x / RBC: x / WBC x   Sq Epi: x / Non Sq Epi: x / Bacteria: x        Rheumatology Work Up    Sedimentation Rate, Erythrocyte: 75 mm/hr *H* [0 - 20] (11-16-23 @ 21:32)  C-Reactive Protein, Serum: 19 mg/L *H* [0 - 4] (11-16-23 @ 21:32)      RADIOLOGY & ADDITIONAL STUDIES:     Cryotherapy Text: The wound bed was treated with cryotherapy after the biopsy was performed. ***consult has been received. note is in progress and incomplete without attending attestation***    Darnell Santoyo MD  PGY-4  Rheumatology Fellow  Reachable on TEAMS  [phone]    PATTI HARRIS  [id number]    HISTORY OF PRESENT ILLNESS:   Ms. Patti Harris, a 78-year-old woman, with PMH of HTN, HLD, dementia presented to [facility] ED , accompanied by her daughter, Ms. Scales (contact: [phone]). The patient's medical history was obtained from both the patient chart and Ms. Scales, as thepatient is a poor historian due to dementia.  Ms. Scales reported that over the past 2 months, the patient has been experiencing intermittent worsening bitemporal/occipital headaches. These headaches have been associated with notable weight loss (10 lbs over the past 2 months), fatigue, forgetfulness, and dizziness. Ms. Scales provided additional details, stating that approximately 6-7 weeks ago, the patient was seen by PCP. At that time, the workup revealed elevated CRP (14) and ESR (84). Suspecting GCA, the PCP initiated treatment with prednisone at 40 mg daily.  However, there was mistakenly took prednisone to 80 mg daily for 3 days, her PCP was decreased it to 20 mg daily. Patient took prednisone 20 mg daily for 10 days but had to discontinue it due to stomach pain. A repeat markers indicated a decrease in ESR to 48 and CRP to 1.  The patient was evaluated by Neurology 2 weeks ago. The workup revealed elevated inflammatory markers, and she reported the onset of blurry vision just 4-5 days before the Neurology visit. Given the concerning findings, Neurology referred the patient to the ED for further evaluation.      MEDICATIONS  (STANDING):  enoxaparin Injectable 40 milliGRAM(s) SubCutaneous every 24 hours  latanoprost 0.005% Ophthalmic Solution 1 Drop(s) Both EYES at bedtime  methylPREDNISolone sodium succinate IVPB 1000 milliGRAM(s) IV Intermittent daily  pantoprazole    Tablet 40 milliGRAM(s) Oral before breakfast    MEDICATIONS  (PRN):  acetaminophen     Tablet .. 650 milliGRAM(s) Oral every 6 hours PRN Temp greater or equal to 38C (100.4F), Mild Pain (1 - 3)  aluminum hydroxide/magnesium hydroxide/simethicone Suspension 30 milliLiter(s) Oral every 4 hours PRN Dyspepsia  melatonin 3 milliGRAM(s) Oral at bedtime PRN Insomnia  ondansetron Injectable 4 milliGRAM(s) IV Push every 8 hours PRN Nausea and/or Vomiting      Allergies    No Known Allergies    Intolerances        PERTINENT MEDICATION HISTORY:      Social History:      PAST MEDICAL & SURGICAL HISTORY:      OCCUPATION:  TRAVEL HISTORY:    FAMILY HISTORY:      Vital Signs Last 24 Hrs  T(C): 36.8 (17 Nov 2023 05:05), Max: 37.2 (16 Nov 2023 19:43)  T(F): 98.2 (17 Nov 2023 05:05), Max: 98.9 (16 Nov 2023 19:43)  HR: 69 (17 Nov 2023 05:05) (69 - 81)  BP: 97/58 (17 Nov 2023 05:05) (97/58 - 149/63)  BP(mean): --  RR: 20 (17 Nov 2023 05:05) (17 - 20)  SpO2: 98% (17 Nov 2023 05:05) (97% - 100%)    Parameters below as of 17 Nov 2023 05:05  Patient On (Oxygen Delivery Method): room air        Physical Exam:  General: No apparent distress  HEENT: EOMI, MMM  CVS: +S1/S2, RRR, no murmurs/rubs/gallops  Resp: CTA b/l. No crackles/wheezing  GI: Soft, NT/ND +BS  MSK: no swelling/warmth/erythema of the joints of the UE/LE  Neuro: AAOx3  Skin: no visible rashes    LABS:                        10.0   9.01  )-----------( 309      ( 17 Nov 2023 07:01 )             30.4     11-17    141  |  104  |  24<H>  ----------------------------<  176<H>  4.7   |  21<L>  |  0.81    Ca    9.7      17 Nov 2023 07:01  Phos  3.9     11-17  Mg     1.7     11-17    TPro  6.5  /  Alb  3.5  /  TBili  0.4  /  DBili  x   /  AST  18  /  ALT  9<L>  /  AlkPhos  89  11-17      Urinalysis Basic - ( 17 Nov 2023 07:01 )    Color: x / Appearance: x / SG: x / pH: x  Gluc: 176 mg/dL / Ketone: x  / Bili: x / Urobili: x   Blood: x / Protein: x / Nitrite: x   Leuk Esterase: x / RBC: x / WBC x   Sq Epi: x / Non Sq Epi: x / Bacteria: x        Rheumatology Work Up    Sedimentation Rate, Erythrocyte: 75 mm/hr *H* [0 - 20] (11-16-23 @ 21:32)  C-Reactive Protein, Serum: 19 mg/L *H* [0 - 4] (11-16-23 @ 21:32)      RADIOLOGY & ADDITIONAL STUDIES:     Lab: 3 Additional Anesthesia Volume In Cc (Will Not Render If 0): 0 ***consult has been received. note is in progress and incomplete without attending attestation***    Darnell Santoyo MD  PGY-4  Rheumatology Fellow  Reachable on TEAMS  [phone]    PATTI HARRIS  [id number]    HISTORY OF PRESENT ILLNESS:   Ms. Patti Harris, a 78-year-old woman, with PMH of HTN, HLD, dementia presented to [facility] ED , accompanied by her daughter, Ms. Scales (contact: [phone]). The patient's medical history was obtained from both the patient chart and Ms. Scales, as thepatient is a poor historian due to dementia.  Ms. Scales reported that over the past 2 months, the patient has been experiencing intermittent worsening bitemporal/occipital headaches. These headaches have been associated with notable weight loss (10 lbs over the past 2 months), fatigue, forgetfulness, and dizziness. Ms. Scales provided additional details, stating that approximately 6-7 weeks ago, the patient was seen by PCP. At that time, the workup revealed elevated CRP (14) and ESR (84). Suspecting GCA, the PCP initiated treatment with prednisone at 40 mg daily.  However, there was mistakenly took prednisone to 80 mg daily for 3 days, her PCP was decreased it to 20 mg daily. Patient took prednisone 20 mg daily for 10 days but had to discontinue it due to stomach pain. A repeat markers indicated a decrease in ESR to 48 and CRP to 1.  The patient was evaluated by Neurology 2 weeks ago. The workup revealed elevated inflammatory markers, and she reported the onset of blurry vision just 4-5 days before the Neurology visit. Given the concerning findings, Neurology referred the patient to the ED for further evaluation.      MEDICATIONS  (STANDING):  enoxaparin Injectable 40 milliGRAM(s) SubCutaneous every 24 hours  latanoprost 0.005% Ophthalmic Solution 1 Drop(s) Both EYES at bedtime  methylPREDNISolone sodium succinate IVPB 1000 milliGRAM(s) IV Intermittent daily  pantoprazole    Tablet 40 milliGRAM(s) Oral before breakfast    MEDICATIONS  (PRN):  acetaminophen     Tablet .. 650 milliGRAM(s) Oral every 6 hours PRN Temp greater or equal to 38C (100.4F), Mild Pain (1 - 3)  aluminum hydroxide/magnesium hydroxide/simethicone Suspension 30 milliLiter(s) Oral every 4 hours PRN Dyspepsia  melatonin 3 milliGRAM(s) Oral at bedtime PRN Insomnia  ondansetron Injectable 4 milliGRAM(s) IV Push every 8 hours PRN Nausea and/or Vomiting      Allergies    No Known Allergies    Intolerances        PERTINENT MEDICATION HISTORY:      Social History:  PAST MEDICAL & SURGICAL HISTORY:      OCCUPATION:  TRAVEL HISTORY:    FAMILY HISTORY:      Vital Signs Last 24 Hrs  T(C): 36.8 (17 Nov 2023 05:05), Max: 37.2 (16 Nov 2023 19:43)  T(F): 98.2 (17 Nov 2023 05:05), Max: 98.9 (16 Nov 2023 19:43)  HR: 69 (17 Nov 2023 05:05) (69 - 81)  BP: 97/58 (17 Nov 2023 05:05) (97/58 - 149/63)  BP(mean): --  RR: 20 (17 Nov 2023 05:05) (17 - 20)  SpO2: 98% (17 Nov 2023 05:05) (97% - 100%)    Parameters below as of 17 Nov 2023 05:05  Patient On (Oxygen Delivery Method): room air        Physical Exam:  General: No apparent distress  HEENT: TA pulse palpable, tenderness on bilateral temporal and occipital regions   CVS: +S1/S2, RRR, no murmurs/rubs/gallops  Resp: CTA b/l. No crackles/wheezing  GI: Soft, NT/ND +BS  MSK: no swelling/warmth/erythema of the joints of the UE/LE  Neuro: AAOx3  Skin: no visible rashes    LABS:                        10.0   9.01  )-----------( 309      ( 17 Nov 2023 07:01 )             30.4     11-17    141  |  104  |  24<H>  ----------------------------<  176<H>  4.7   |  21<L>  |  0.81    Ca    9.7      17 Nov 2023 07:01  Phos  3.9     11-17  Mg     1.7     11-17    TPro  6.5  /  Alb  3.5  /  TBili  0.4  /  DBili  x   /  AST  18  /  ALT  9<L>  /  AlkPhos  89  11-17      Urinalysis Basic - ( 17 Nov 2023 07:01 )    Color: x / Appearance: x / SG: x / pH: x  Gluc: 176 mg/dL / Ketone: x  / Bili: x / Urobili: x   Blood: x / Protein: x / Nitrite: x   Leuk Esterase: x / RBC: x / WBC x   Sq Epi: x / Non Sq Epi: x / Bacteria: x        Rheumatology Work Up    Sedimentation Rate, Erythrocyte: 75 mm/hr *H* [0 - 20] (11-16-23 @ 21:32)  C-Reactive Protein, Serum: 19 mg/L *H* [0 - 4] (11-16-23 @ 21:32)      RADIOLOGY & ADDITIONAL STUDIES:     Silver Nitrate Text: The wound bed was treated with silver nitrate after the biopsy was performed. ***consult has been received. note is in progress and incomplete without attending attestation***    Darnell Santoyo MD  PGY-4  Rheumatology Fellow  Reachable on TEAMS  [phone]    PATTI HARRIS  [id number]    HISTORY OF PRESENT ILLNESS:   Ms. Patti Harris, a 78-year-old woman, with PMH of HTN, HLD, dementia presented to [facility] ED , accompanied by her daughter, Ms. Scales (contact: [phone]). The patient's medical history was obtained from both the patient chart and Ms. Scales, as thepatient is a poor historian due to dementia.  Ms. Scales reported that over the past 2 months, the patient has been experiencing intermittent worsening bitemporal/occipital headaches. These headaches have been associated with notable weight loss (10 lbs over the past 2 months), fatigue, forgetfulness, and dizziness. Ms. Scales provided additional details, stating that approximately 6-7 weeks ago, the patient was seen by PCP. At that time, the workup revealed elevated CRP (14) and ESR (84). Suspecting GCA, the PCP initiated treatment with prednisone at 40 mg daily.  However, there was mistakenly took prednisone to 80 mg daily for 3 days, her PCP was decreased it to 20 mg daily. Patient took prednisone 20 mg daily for 10 days but had to discontinue it due to stomach pain. A repeat markers indicated a decrease in ESR to 48 and CRP to 1.  The patient was evaluated by Neurology 2 weeks ago. The workup revealed elevated inflammatory markers, and she reported the onset of blurry vision just 4-5 days before the Neurology visit. Given the concerning findings, Neurology referred the patient to the ED for further evaluation.      MEDICATIONS  (STANDING):  enoxaparin Injectable 40 milliGRAM(s) SubCutaneous every 24 hours  latanoprost 0.005% Ophthalmic Solution 1 Drop(s) Both EYES at bedtime  methylPREDNISolone sodium succinate IVPB 1000 milliGRAM(s) IV Intermittent daily  pantoprazole    Tablet 40 milliGRAM(s) Oral before breakfast    MEDICATIONS  (PRN):  acetaminophen     Tablet .. 650 milliGRAM(s) Oral every 6 hours PRN Temp greater or equal to 38C (100.4F), Mild Pain (1 - 3)  aluminum hydroxide/magnesium hydroxide/simethicone Suspension 30 milliLiter(s) Oral every 4 hours PRN Dyspepsia  melatonin 3 milliGRAM(s) Oral at bedtime PRN Insomnia  ondansetron Injectable 4 milliGRAM(s) IV Push every 8 hours PRN Nausea and/or Vomiting      Allergies    No Known Allergies    Intolerances        PERTINENT MEDICATION HISTORY:      Social History:  PAST MEDICAL & SURGICAL HISTORY:      OCCUPATION:  TRAVEL HISTORY:    FAMILY HISTORY:      Vital Signs Last 24 Hrs  T(C): 36.8 (17 Nov 2023 05:05), Max: 37.2 (16 Nov 2023 19:43)  T(F): 98.2 (17 Nov 2023 05:05), Max: 98.9 (16 Nov 2023 19:43)  HR: 69 (17 Nov 2023 05:05) (69 - 81)  BP: 97/58 (17 Nov 2023 05:05) (97/58 - 149/63)  BP(mean): --  RR: 20 (17 Nov 2023 05:05) (17 - 20)  SpO2: 98% (17 Nov 2023 05:05) (97% - 100%)    Parameters below as of 17 Nov 2023 05:05  Patient On (Oxygen Delivery Method): room air        Physical Exam:  General: No apparent distress  HEENT: TA pulse palpable, tenderness on bilateral temporal and occipital regions,  carotodynia on the left side of the neck   CVS: +S1/S2, RRR, no murmurs/rubs/gallops  Resp: CTA b/l. No crackles/wheezing  GI: Soft, NT/ND +BS  MSK: no swelling/warmth/erythema of the joints of the UE/LE  Neuro: AAOx3  Skin: no visible rashes    LABS:                        10.0   9.01  )-----------( 309      ( 17 Nov 2023 07:01 )             30.4     11-17    141  |  104  |  24<H>  ----------------------------<  176<H>  4.7   |  21<L>  |  0.81    Ca    9.7      17 Nov 2023 07:01  Phos  3.9     11-17  Mg     1.7     11-17    TPro  6.5  /  Alb  3.5  /  TBili  0.4  /  DBili  x   /  AST  18  /  ALT  9<L>  /  AlkPhos  89  11-17      Urinalysis Basic - ( 17 Nov 2023 07:01 )    Color: x / Appearance: x / SG: x / pH: x  Gluc: 176 mg/dL / Ketone: x  / Bili: x / Urobili: x   Blood: x / Protein: x / Nitrite: x   Leuk Esterase: x / RBC: x / WBC x   Sq Epi: x / Non Sq Epi: x / Bacteria: x        Rheumatology Work Up    Sedimentation Rate, Erythrocyte: 75 mm/hr *H* [0 - 20] (11-16-23 @ 21:32)  C-Reactive Protein, Serum: 19 mg/L *H* [0 - 4] (11-16-23 @ 21:32)      RADIOLOGY & ADDITIONAL STUDIES:     Post-Care Instructions: I reviewed with the patient in detail post-care instructions. Patient is to keep the biopsy site dry overnight, and then apply bacitracin twice daily until healed. Patient may apply hydrogen peroxide soaks to remove any crusting. Notification Instructions: Patient will be notified of biopsy results. However, patient instructed to call the office if not contacted within 2 weeks.   PATTI HARRIS  [id number]    HISTORY OF PRESENT ILLNESS:   Ms. Patti Harris, a 78-year-old woman, with PMH of HTN, HLD, dementia presented to [facility] ED , accompanied by her daughter, Ms. Scales (contact: [phone]). The patient's medical history was obtained from both the patient chart and Ms. Scales, as thepatient is a poor historian due to dementia.  Ms. Scales reported that over the past 2 months, the patient has been experiencing intermittent worsening bitemporal/occipital headaches. These headaches have been associated with notable weight loss (10 lbs over the past 2 months), fatigue, forgetfulness, and dizziness. Ms. Scales provided additional details, stating that approximately 6-7 weeks ago, the patient was seen by PCP. At that time, the workup revealed elevated CRP (14) and ESR (84). Suspecting GCA, the PCP initiated treatment with prednisone at 40 mg daily.  However, there was mistakenly took prednisone to 80 mg daily for 3 days, her PCP was decreased it to 20 mg daily. Patient took prednisone 20 mg daily for 10 days but had to discontinue it due to stomach pain. A repeat markers indicated a decrease in ESR to 48 and CRP to 1.  The patient was evaluated by Neurology 2 weeks ago. The workup revealed elevated inflammatory markers, and she reported the onset of blurry vision just 4-5 days before the Neurology visit. Given the concerning findings, Neurology referred the patient to the ED for further evaluation.      MEDICATIONS  (STANDING):  enoxaparin Injectable 40 milliGRAM(s) SubCutaneous every 24 hours  latanoprost 0.005% Ophthalmic Solution 1 Drop(s) Both EYES at bedtime  methylPREDNISolone sodium succinate IVPB 1000 milliGRAM(s) IV Intermittent daily  pantoprazole    Tablet 40 milliGRAM(s) Oral before breakfast    MEDICATIONS  (PRN):  acetaminophen     Tablet .. 650 milliGRAM(s) Oral every 6 hours PRN Temp greater or equal to 38C (100.4F), Mild Pain (1 - 3)  aluminum hydroxide/magnesium hydroxide/simethicone Suspension 30 milliLiter(s) Oral every 4 hours PRN Dyspepsia  melatonin 3 milliGRAM(s) Oral at bedtime PRN Insomnia  ondansetron Injectable 4 milliGRAM(s) IV Push every 8 hours PRN Nausea and/or Vomiting      Allergies    No Known Allergies    Intolerances        PERTINENT MEDICATION HISTORY:      Social History:  PAST MEDICAL & SURGICAL HISTORY:      OCCUPATION:  TRAVEL HISTORY:    FAMILY HISTORY:      Vital Signs Last 24 Hrs  T(C): 36.8 (17 Nov 2023 05:05), Max: 37.2 (16 Nov 2023 19:43)  T(F): 98.2 (17 Nov 2023 05:05), Max: 98.9 (16 Nov 2023 19:43)  HR: 69 (17 Nov 2023 05:05) (69 - 81)  BP: 97/58 (17 Nov 2023 05:05) (97/58 - 149/63)  BP(mean): --  RR: 20 (17 Nov 2023 05:05) (17 - 20)  SpO2: 98% (17 Nov 2023 05:05) (97% - 100%)    Parameters below as of 17 Nov 2023 05:05  Patient On (Oxygen Delivery Method): room air        Physical Exam:  General: No apparent distress  HEENT: TA pulse palpable, tenderness on bilateral temporal and occipital regions,  carotodynia on the left side of the neck   CVS: +S1/S2, RRR, no murmurs/rubs/gallops  Resp: CTA b/l. No crackles/wheezing  GI: Soft, NT/ND +BS  MSK: no swelling/warmth/erythema of the joints of the UE/LE  Neuro: AAOx3  Skin: no visible rashes    LABS:                        10.0   9.01  )-----------( 309      ( 17 Nov 2023 07:01 )             30.4     11-17    141  |  104  |  24<H>  ----------------------------<  176<H>  4.7   |  21<L>  |  0.81    Ca    9.7      17 Nov 2023 07:01  Phos  3.9     11-17  Mg     1.7     11-17    TPro  6.5  /  Alb  3.5  /  TBili  0.4  /  DBili  x   /  AST  18  /  ALT  9<L>  /  AlkPhos  89  11-17      Urinalysis Basic - ( 17 Nov 2023 07:01 )    Color: x / Appearance: x / SG: x / pH: x  Gluc: 176 mg/dL / Ketone: x  / Bili: x / Urobili: x   Blood: x / Protein: x / Nitrite: x   Leuk Esterase: x / RBC: x / WBC x   Sq Epi: x / Non Sq Epi: x / Bacteria: x        Rheumatology Work Up    Sedimentation Rate, Erythrocyte: 75 mm/hr *H* [0 - 20] (11-16-23 @ 21:32)  C-Reactive Protein, Serum: 19 mg/L *H* [0 - 4] (11-16-23 @ 21:32)      RADIOLOGY & ADDITIONAL STUDIES:     Electrodesiccation And Curettage Text: The wound bed was treated with electrodesiccation and curettage after the biopsy was performed. Hemostasis: Aluminum Chloride Anesthesia Type: 1% lidocaine with epinephrine Biopsy Method: double edge Personna blade Biopsy Type: H and E Lab Facility: 1 Depth Of Biopsy: dermis Dressing: bandage Consent: Written consent was obtained and risks were reviewed including but not limited to scarring, infection, bleeding, scabbing, incomplete removal, nerve damage and allergy to anesthesia. Wound Care: Petrolatum Curettage Text: The wound bed was treated with curettage after the biopsy was performed. Anesthesia Volume In Cc (Will Not Render If 0): 0.5 Billing Type: Third-Party Bill Was A Bandage Applied: Yes Electrodesiccation Text: The wound bed was treated with electrodesiccation after the biopsy was performed. Detail Level: Detailed

## 2023-11-17 NOTE — PROGRESS NOTE ADULT - SUBJECTIVE AND OBJECTIVE BOX
[facility] DEPARTMENT OF OPHTHALMOLOGY  ------------------------------------------------------------------------------  Zaina Tadeo MD, PGY-3  Contact: TEAMS  ------------------------------------------------------------------------------    Interval History: No acute events overnight. Today patient denying blurred vision, eye pain, flashes, floaters, FBS, erythema, or discharge.     MEDICATIONS  (STANDING):  enoxaparin Injectable 40 milliGRAM(s) SubCutaneous every 24 hours  latanoprost 0.005% Ophthalmic Solution 1 Drop(s) Both EYES at bedtime  methylPREDNISolone sodium succinate IVPB 1000 milliGRAM(s) IV Intermittent daily  pantoprazole    Tablet 40 milliGRAM(s) Oral before breakfast    MEDICATIONS  (PRN):  acetaminophen     Tablet .. 650 milliGRAM(s) Oral every 6 hours PRN Temp greater or equal to 38C (100.4F), Mild Pain (1 - 3)  aluminum hydroxide/magnesium hydroxide/simethicone Suspension 30 milliLiter(s) Oral every 4 hours PRN Dyspepsia  melatonin 3 milliGRAM(s) Oral at bedtime PRN Insomnia  ondansetron Injectable 4 milliGRAM(s) IV Push every 8 hours PRN Nausea and/or Vomiting      VITALS: T(C): 36.4 (11-17-23 @ 12:53)  T(F): 97.6 (11-17-23 @ 12:53), Max: 98.9 (11-16-23 @ 19:43)  HR: 69 (11-17-23 @ 12:53) (69 - 81)  BP: 115/70 (11-17-23 @ 12:53) (97/58 - 149/63)  RR:  (17 - 20)  SpO2:  (97% - 100%)  Wt(kg): --  General: AAO x 3, appropriate mood and affect    Ophthalmology Exam:  Visual acuity (cc): 20/30 OD, 20/25 OS  Pupils: PERRL OU, no RAPD  Ttono: STP OU  Extraocular movements (EOMs): Full OU, no pain, no diplopia  Confrontational Visual Field (CVF): Full OD, Full OS    Pen Light Exam (PLE)  External: Flat OU  Lids/Lashes/Lacrimal Ducts: Flat OU    Sclera/Conjunctiva: W+Q OU  Cornea: Cl OU  Anterior Chamber: D+F OU    Iris: Flat OU  Lens: Cl OU    Fundus Exam: dilated with 1% tropicamide and 2.5% phenylephrine  Approval obtained from primary team for dilation  Patient aware that pupils can remained dilated for at least 4-6 hours  Exam performed with 20D lens    Vitreous: wnl OU  Disc, cup/disc: sharp and pink, 0.8 OD, 0.7 OS   Macula: drusen OU  Vessels: wnl OU  Periphery: wnl OU

## 2023-11-17 NOTE — H&P ADULT - NSHPPHYSICALEXAM_GEN_ALL_CORE
GENERAL: NAD, lying in bed comfortably  HEAD:  Atraumatic, normocephalic  EYES: EOMI, conjunctiva and sclera clear  NECK: Supple, trachea midline, no JVD  HEART: Regular rate and rhythm, no murmurs, rubs, or gallops  LUNGS: Unlabored respirations.  Clear to auscultation bilaterally.  ABDOMEN: Soft, nontender, nondistended  EXTREMITIES: No LE edema  NERVOUS SYSTEM:  A&Ox3, moving all extremities, no focal deficits, sensation grossly intact, motor grossly intact   SKIN: No rashes or lesions

## 2023-11-17 NOTE — DIETITIAN INITIAL EVALUATION ADULT - PERTINENT LABORATORY DATA
11-17    141  |  104  |  24<H>  ----------------------------<  176<H>  4.7   |  21<L>  |  0.81    Ca    9.7      17 Nov 2023 07:01  Phos  3.9     11-17  Mg     1.7     11-17    TPro  6.5  /  Alb  3.5  /  TBili  0.4  /  DBili  x   /  AST  18  /  ALT  9<L>  /  AlkPhos  89  11-17

## 2023-11-17 NOTE — PATIENT PROFILE ADULT - FALL HARM RISK - FALL HARM RISK
Alert-The patient is alert, awake and responds to voice. The patient is oriented to time, place, and person. The triage nurse is able to obtain subjective information. Other

## 2023-11-17 NOTE — DIETITIAN INITIAL EVALUATION ADULT - PERTINENT MEDS FT
MEDICATIONS  (STANDING):  enoxaparin Injectable 40 milliGRAM(s) SubCutaneous every 24 hours  latanoprost 0.005% Ophthalmic Solution 1 Drop(s) Both EYES at bedtime  methylPREDNISolone sodium succinate IVPB 1000 milliGRAM(s) IV Intermittent daily  pantoprazole    Tablet 40 milliGRAM(s) Oral before breakfast    MEDICATIONS  (PRN):  acetaminophen     Tablet .. 650 milliGRAM(s) Oral every 6 hours PRN Temp greater or equal to 38C (100.4F), Mild Pain (1 - 3)  aluminum hydroxide/magnesium hydroxide/simethicone Suspension 30 milliLiter(s) Oral every 4 hours PRN Dyspepsia  melatonin 3 milliGRAM(s) Oral at bedtime PRN Insomnia  ondansetron Injectable 4 milliGRAM(s) IV Push every 8 hours PRN Nausea and/or Vomiting

## 2023-11-17 NOTE — CONSULT NOTE ADULT - SUBJECTIVE AND OBJECTIVE BOX
[facility] DEPARTMENT OF OPHTHALMOLOGY - INITIAL ADULT CONSULT  ----------------------------------------------------------------------------------------------------  Mansoor Morales MD PGY-2  Available on teams  ----------------------------------------------------------------------------------------------------    HPI & ROS: 70-year-old female with a history of hypertension, hyperlipidemia coming in with weeks of not changing temporal bilateral headache.  Taking Tylenol and Motrin for this.  In the outpatient setting, her neurology physician was concern for GCA,  difficulties in scheduling a biopsy.  ESR and CRP resulted from blood work done at the primary care office with an ESR of 90 and a CRP of around 16, was sent here and neurology in-house apparently aware of the patient.  No fevers no chills.  Patient having arthralgias myalgias.  No cough no congestion.  No chest pain no shortness of breath.  Has been losing weight as well. Neuro: orville jacobson    Interval History: Pt states she felt her vision was a little blurrier and was sent to ED by her physician.    PAST MEDICAL & SURGICAL HISTORY:    Past Ocular History: glaucoma, cataract surgery, macular degeneration  Ophthalmic Medications: prostaglandin gtt OU qhs (pt does not remember which one  FAMILY HISTORY:    Social History: n/a    MEDICATIONS  (STANDING):    MEDICATIONS  (PRN):    Allergies & Intolerances:   No Known Allergies    Review of Systems:  Constitutional: No fever, chills  Eyes: blurry vision OU  Neuro: No tremors  Cardiovascular: No chest pain, palpitations  Respiratory: No SOB, no cough  GI: No nausea, vomiting, abdominal pain  : No dysuria  Skin: no rash  Psych: no depression  Endocrine: no polyuria, polydipsia  Heme/lymph: no swelling    VITALS: T(C): 36.7 (11-17-23 @ 01:19)  T(F): 98.1 (11-17-23 @ 01:19), Max: 98.9 (11-16-23 @ 19:43)  HR: 81 (11-17-23 @ 01:19) (69 - 81)  BP: 149/63 (11-17-23 @ 01:19) (129/70 - 149/63)  RR:  (17 - 18)  SpO2:  (97% - 100%)  Wt(kg): --  General: AAO x 3, appropriate mood and affect    Ophthalmology Exam:  Visual acuity (cc): 20/20 OD, 20/20 OS  Pupils: PERRL OU, no APD  Ttono: 23 OD, 18 OS  Extraocular movements (EOMs): Full OU, no pain, no diplopia  Confrontational Visual Field (CVF): Full OU  Color Plates: 6/12 OD, 9/12 OS    Pen Light Exam (PLE)  External: Flat OU  Lids/Lashes/Lacrimal Ducts: Flat OU    Sclera/Conjunctiva: W+Q OU  Cornea: Cl OU  Anterior Chamber: D+F OU    Iris: Flat OU  Lens: Cl OU    Fundus Exam: dilated with 1% tropicamide and 2.5% phenylephrine  Approval obtained from primary team for dilation  Patient aware that pupils can remained dilated for at least 4-6 hours  Exam performed with 20D lens    Vitreous: wnl OU  Disc, cup/disc: sharp and pink, 0.8 OD, 0.7 OS   Macula: AMD OU  Vessels: wnl OU  Periphery: wnl OU    Labs/Imaging:  ***

## 2023-11-17 NOTE — H&P ADULT - NSHPLABSRESULTS_GEN_ALL_CORE
.  LABS:                         10.3   7.21  )-----------( 308      ( 16 Nov 2023 21:32 )             32.2     11-16    139  |  102  |  26<H>  ----------------------------<  114<H>  4.5   |  24  |  0.85    Ca    10.1      16 Nov 2023 21:32  Mg     1.8     11-16        Urinalysis Basic - ( 16 Nov 2023 21:32 )    Color: x / Appearance: x / SG: x / pH: x  Gluc: 114 mg/dL / Ketone: x  / Bili: x / Urobili: x   Blood: x / Protein: x / Nitrite: x   Leuk Esterase: x / RBC: x / WBC x   Sq Epi: x / Non Sq Epi: x / Bacteria: x            RADIOLOGY, EKG & ADDITIONAL TESTS: Reviewed.

## 2023-11-17 NOTE — PATIENT PROFILE ADULT - FALL HARM RISK - HARM RISK INTERVENTIONS
Assistance with ambulation/Assistance OOB with selected safe patient handling equipment/Communicate Risk of Fall with Harm to all staff/Monitor gait and stability/Reinforce activity limits and safety measures with patient and family/Sit up slowly, dangle for a short time, stand at bedside before walking/Tailored Fall Risk Interventions/Visual Cue: Yellow wristband and red socks/Bed in lowest position, wheels locked, appropriate side rails in place/Call bell, personal items and telephone in reach/Instruct patient to call for assistance before getting out of bed or chair/Non-slip footwear when patient is out of bed/[place] to call system/Physically safe environment - no spills, clutter or unnecessary equipment/Purposeful Proactive Rounding/Room/bathroom lighting operational, light cord in reach

## 2023-11-17 NOTE — PROGRESS NOTE ADULT - ATTENDING COMMENTS
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 headaches are improved and no diplopia or blurry vision  Exam + TTP to temporal region  - appreciate optho, neuro recs; had opt CT/MRI reportedly negative will ask family to bring in  - vascular re: temporal artery biopsu rheum c/s appreciated- s/p 1gm of methylprednislione, c/w x 2 days  med rec pending, please confirm home meds with pharmacy

## 2023-11-17 NOTE — H&P ADULT - ATTENDING COMMENTS
78y F pmh htn, hld here for c/o persistent b/l temporal HA for weeks found to have elevated ESR/CRP  On O/P labs, c/f GCA, admitted for further eval       #GCA   - P/w bitemporal HA, noted to have elevated ESR/CRP O/P c/f GCA vs other vasculitis   - Here CRP 19, ESR 75  - Steroids per neuro   - Check tsh   - Likely will need temporal artery biopsy, will differ to neuro   - S/p Optho eval, aprec rec: no ophthalmic manifestations of GCA, elevated IOP--start Latanoprost   - Rheum consult to be called in AM   - Supportive care       Rest per resident

## 2023-11-17 NOTE — PROGRESS NOTE ADULT - ASSESSMENT
78y female with a past medical history/ocular history of htn, hld, glaucoma, cataract surgery, AMD consulted for r/o ocular involvement of GCA, found to have no ocular evidence of GCA.    #High concern for GCA with no ocular findings  - Pt has had weeks of temporal bilateral headache.  - her neurology physician was concerned for GCA,  difficulties in scheduling a biopsy. Pt having sx of weight loss, fatigue, possible scalp tenderness?  - ESR and CRP resulted from blood work done at the primary care office with an ESR of 90 and a CRP of around 16  - CRP in ED was 19, ESR 75  - s/p 1g IV solumedrol in the ED  - Per primary team will continue 1g IV steroids x3 days followed by oral taper  - No disc edema seen on exam, no ophthalmic manifestations of GCA on exam  - Appreciate neurology and rheumatology recs  - Agree with TAB to help determine management / length of steroid taper if patient is amenable    #Glaucoma  - On initial exam IOP elevated in the right eye to 23, 18 in the left along with cupped nerved  - Pt is on a prostaglandin gtt outpt, does not remember which one  -Continue  latanoprost qhs to both eyes given significant cupping and elevated IOP    #Macular degeneration  - follow up outpatient    SDW Dr. Gordillo, attending     Outpatient Follow-up: Patient should follow-up with his/her ophthalmologist or with [facility] Department of Ophthalmology within 1 week of after discharge at:    [address]  [place], [place] [id number] [phone]    Zaina Tadeo, PGY-3  Available on [employer]

## 2023-11-17 NOTE — H&P ADULT - PROBLEM SELECTOR PLAN 4
-Pt on medication outpatient, but cannot remember names/doses  -Please f/u with daughter for medications in AM

## 2023-11-17 NOTE — CONSULT NOTE ADULT - ASSESSMENT
Ms. Melinda Garcia, 78, with a history of hypertension, hyperlipidemia, and dementia, presented to [facility] ED with worsening bitemporal/occipital headaches, 10 lbs weight loss, fatigue, forgetfulness, and dizziness over the past 2 months. Previously treated for suspected GCA with prednisone, the dosage was mistakenly increased to 80 mg for 3 days, then reduced to 20 mg which was stopped due to stomach pain. Repeat markers showed improvement, but Neurology evaluation 2 weeks ago revealed elevated inflammatory markers and new-onset blurry vision. Referred to ED for further assessment. Daughter's contact: [phone].    # GCA  - B/l temporal headache for past 2 months with 10 lbs weight loss, fatigue, forgetfulness, and dizziness  - ESR 75, CRP 19  -       Recommendations  1. Please consult with Vascular Surgery for bitemporal temporal artery biopsy  2. Patient received 1 gr methylprednisolone today,     D/w Dr. Escalona Ms. Melinda Garcia, 78, with a history of hypertension, hyperlipidemia, and dementia, presented to [facility] ED with worsening bitemporal/occipital headaches, 10 lbs weight loss, fatigue, forgetfulness, and dizziness over the past 2 months. Previously treated for suspected GCA with prednisone, the dosage was mistakenly increased to 80 mg for 3 days, then reduced to 20 mg which was stopped due to stomach pain. Repeat markers showed improvement, but Neurology evaluation 2 weeks ago revealed elevated inflammatory markers and new-onset blurry vision. Referred to ED for further assessment. Daughter's contact: [phone].    # GCA  - B/l temporal headache for past 2 months with 10 lbs weight loss, fatigue, forgetfulness, and dizziness  - On exam, scalp tenderness on the bitemporal and occipital regions and possible carotodynia on the left side of the neck   - Opthalmology note reviewed, eye exam wnl   - ESR 75, CRP 19  - Neurology was recommended to start methylprednisolone 1 g daily, the patient received the first dose this morning     Recommendations  1. Please consult with Vascular Surgery for bitemporal temporal artery biopsy  2. Patient received 1 gr methylprednisolone this morning,  decrease steroid to 500 mg daily for 2 more days ( 11/18 and 11/19) then continue with prednisone orally 1 mg/kg daily   3. Please start Bactrim DS three times weekly  4. Please order MRI brain and orbits w/wo contrast     D/w Dr. Escalona Ms. Melinda Garcia, 78, with a history of hypertension, hyperlipidemia, and dementia, presented to [facility] ED with worsening bitemporal/occipital headaches, 10 lbs weight loss, fatigue, forgetfulness, and dizziness over the past 2 months. Previously treated for suspected GCA with prednisone, the dosage was mistakenly increased to 80 mg for 3 days, then reduced to 20 mg which was stopped due to stomach pain. Repeat markers showed improvement, but Neurology evaluation 2 weeks ago revealed elevated inflammatory markers and new-onset blurry vision. Referred to ED for further assessment. Daughter's contact: [phone].    # GCA  - B/l temporal headache for past 2 months with 10 lbs weight loss, fatigue, forgetfulness, and dizziness  - On exam, there was scalp tenderness noted in the bitemporal and occipital regions, and carotodynia was observed on the left side of the neck.   - The ophthalmology note was reviewed, patient reported vision changes, but the eye exam showed no CGA findings   - ESR 75, CRP 19  - Neurology was recommended to start methylprednisolone 1 g daily, the patient received the first dose this morning     Recommendations  1. Please consult with Vascular Surgery for bitemporal temporal artery biopsy  2. Patient received 1 gr methylprednisolone this morning,  decrease steroid to 500 mg daily for 2 more days ( 11/18 and 11/19) then continue with prednisone orally 1 mg/kg daily   3. Please start Bactrim DS three times weekly  4. Please order MRI brain and orbits w/wo contrast   4. Please order Duplex US for carotid arteries     D/w Dr. Iqra Santoyo MD  PGY-4  Rheumatology Fellow  [employer] on TEAMS  [phone]

## 2023-11-17 NOTE — CONSULT NOTE ADULT - ASSESSMENT
79 y/o female with PMH of HTN, HLD presenting with "weeks" of bilateral temporal headache.    Anemia  --under the care of Dr Dos Santos of [facility]  --outpatient lab work w/o evidence of hemolysis and/or nutritional deficiencies No monoclonal protein on SPEP or serum immunofixation and unremarkable serum free light  chains. No abnormal immunophenotype noted on flow cytometry.  --last endoscopy and colonoscopy (done 2020) w/o signs of GIB  --imaging of the abdomen and pelvis ( done outpatient 4/23) w/o suspected neoplasm and/or bleed   --anemia likely 2/2 inflammation  --Hgb ~10 at baseline    headaches  --treated outpatient w/ prednisone 20 for temporal arteritis, may need biopsy  --rheumatology consulted  --evaluated by ophthalmology and found not to have any disc edema or ophthalmologic manifestations of GCA on exam  --declined CT, MRI in ED. Daughter reports she had these done recently and they were unremarkable  --management per primary team    will follow  Elo Covarrubias NP  Hematology/ Oncology  [facility]  [phone] (office)  [phone] (alt office)  Evenings and weekends please call MD on call or office

## 2023-11-17 NOTE — H&P ADULT - PROBLEM SELECTOR PLAN 2
-IOP elevated on exam  -Pt is on a prostaglandin outpatient, unsure which one  -Will start latanoprost qhs to both eyes, per ophthalmology

## 2023-11-18 ENCOUNTER — TRANSCRIPTION ENCOUNTER (OUTPATIENT)
Age: 78
End: 2023-11-18

## 2023-11-18 DIAGNOSIS — H53.8 OTHER VISUAL DISTURBANCES: ICD-10-CM

## 2023-11-18 DIAGNOSIS — R51.9 HEADACHE, UNSPECIFIED: ICD-10-CM

## 2023-11-18 DIAGNOSIS — R70.0 ELEVATED ERYTHROCYTE SEDIMENTATION RATE: ICD-10-CM

## 2023-11-18 LAB
ALBUMIN SERPL ELPH-MCNC: 3.8 G/DL — SIGNIFICANT CHANGE UP (ref 3.3–5)
ALBUMIN SERPL ELPH-MCNC: 3.8 G/DL — SIGNIFICANT CHANGE UP (ref 3.3–5)
ALP SERPL-CCNC: 89 U/L — SIGNIFICANT CHANGE UP (ref 40–120)
ALP SERPL-CCNC: 89 U/L — SIGNIFICANT CHANGE UP (ref 40–120)
ALT FLD-CCNC: 12 U/L — SIGNIFICANT CHANGE UP (ref 10–45)
ALT FLD-CCNC: 12 U/L — SIGNIFICANT CHANGE UP (ref 10–45)
AST SERPL-CCNC: 19 U/L — SIGNIFICANT CHANGE UP (ref 10–40)
AST SERPL-CCNC: 19 U/L — SIGNIFICANT CHANGE UP (ref 10–40)
BASOPHILS # BLD AUTO: 0.01 K/UL — SIGNIFICANT CHANGE UP (ref 0–0.2)
BASOPHILS # BLD AUTO: 0.01 K/UL — SIGNIFICANT CHANGE UP (ref 0–0.2)
BASOPHILS NFR BLD AUTO: 0.1 % — SIGNIFICANT CHANGE UP (ref 0–2)
BASOPHILS NFR BLD AUTO: 0.1 % — SIGNIFICANT CHANGE UP (ref 0–2)
BILIRUB SERPL-MCNC: 0.5 MG/DL — SIGNIFICANT CHANGE UP (ref 0.2–1.2)
BILIRUB SERPL-MCNC: 0.5 MG/DL — SIGNIFICANT CHANGE UP (ref 0.2–1.2)
BUN SERPL-MCNC: 31 MG/DL — HIGH (ref 7–23)
BUN SERPL-MCNC: 31 MG/DL — HIGH (ref 7–23)
CALCIUM SERPL-MCNC: 9.8 MG/DL — SIGNIFICANT CHANGE UP (ref 8.4–10.5)
CALCIUM SERPL-MCNC: 9.8 MG/DL — SIGNIFICANT CHANGE UP (ref 8.4–10.5)
CHLORIDE SERPL-SCNC: 104 MMOL/L — SIGNIFICANT CHANGE UP (ref 96–108)
CHLORIDE SERPL-SCNC: 104 MMOL/L — SIGNIFICANT CHANGE UP (ref 96–108)
CO2 SERPL-SCNC: 24 MMOL/L — SIGNIFICANT CHANGE UP (ref 22–31)
CO2 SERPL-SCNC: 24 MMOL/L — SIGNIFICANT CHANGE UP (ref 22–31)
CREAT SERPL-MCNC: 0.99 MG/DL — SIGNIFICANT CHANGE UP (ref 0.5–1.3)
CREAT SERPL-MCNC: 0.99 MG/DL — SIGNIFICANT CHANGE UP (ref 0.5–1.3)
EGFR: 58 ML/MIN/1.73M2 — LOW
EGFR: 58 ML/MIN/1.73M2 — LOW
EOSINOPHIL # BLD AUTO: 0 K/UL — SIGNIFICANT CHANGE UP (ref 0–0.5)
EOSINOPHIL # BLD AUTO: 0 K/UL — SIGNIFICANT CHANGE UP (ref 0–0.5)
EOSINOPHIL NFR BLD AUTO: 0 % — SIGNIFICANT CHANGE UP (ref 0–6)
EOSINOPHIL NFR BLD AUTO: 0 % — SIGNIFICANT CHANGE UP (ref 0–6)
GLUCOSE SERPL-MCNC: 169 MG/DL — HIGH (ref 70–99)
GLUCOSE SERPL-MCNC: 169 MG/DL — HIGH (ref 70–99)
HAV IGM SER-ACNC: SIGNIFICANT CHANGE UP
HAV IGM SER-ACNC: SIGNIFICANT CHANGE UP
HBV CORE IGM SER-ACNC: SIGNIFICANT CHANGE UP
HBV CORE IGM SER-ACNC: SIGNIFICANT CHANGE UP
HBV SURFACE AG SER-ACNC: SIGNIFICANT CHANGE UP
HBV SURFACE AG SER-ACNC: SIGNIFICANT CHANGE UP
HCT VFR BLD CALC: 32.2 % — LOW (ref 34.5–45)
HCT VFR BLD CALC: 32.2 % — LOW (ref 34.5–45)
HCV AB S/CO SERPL IA: 0.04 S/CO — SIGNIFICANT CHANGE UP (ref 0–0.99)
HCV AB S/CO SERPL IA: 0.04 S/CO — SIGNIFICANT CHANGE UP (ref 0–0.99)
HCV AB SERPL-IMP: SIGNIFICANT CHANGE UP
HCV AB SERPL-IMP: SIGNIFICANT CHANGE UP
HGB BLD-MCNC: 10.3 G/DL — LOW (ref 11.5–15.5)
HGB BLD-MCNC: 10.3 G/DL — LOW (ref 11.5–15.5)
IMM GRANULOCYTES NFR BLD AUTO: 0.5 % — SIGNIFICANT CHANGE UP (ref 0–0.9)
IMM GRANULOCYTES NFR BLD AUTO: 0.5 % — SIGNIFICANT CHANGE UP (ref 0–0.9)
LYMPHOCYTES # BLD AUTO: 0.55 K/UL — LOW (ref 1–3.3)
LYMPHOCYTES # BLD AUTO: 0.55 K/UL — LOW (ref 1–3.3)
LYMPHOCYTES # BLD AUTO: 6 % — LOW (ref 13–44)
LYMPHOCYTES # BLD AUTO: 6 % — LOW (ref 13–44)
MAGNESIUM SERPL-MCNC: 2 MG/DL — SIGNIFICANT CHANGE UP (ref 1.6–2.6)
MAGNESIUM SERPL-MCNC: 2 MG/DL — SIGNIFICANT CHANGE UP (ref 1.6–2.6)
MCHC RBC-ENTMCNC: 30.5 PG — SIGNIFICANT CHANGE UP (ref 27–34)
MCHC RBC-ENTMCNC: 30.5 PG — SIGNIFICANT CHANGE UP (ref 27–34)
MCHC RBC-ENTMCNC: 32 GM/DL — SIGNIFICANT CHANGE UP (ref 32–36)
MCHC RBC-ENTMCNC: 32 GM/DL — SIGNIFICANT CHANGE UP (ref 32–36)
MCV RBC AUTO: 95.3 FL — SIGNIFICANT CHANGE UP (ref 80–100)
MCV RBC AUTO: 95.3 FL — SIGNIFICANT CHANGE UP (ref 80–100)
MONOCYTES # BLD AUTO: 0.06 K/UL — SIGNIFICANT CHANGE UP (ref 0–0.9)
MONOCYTES # BLD AUTO: 0.06 K/UL — SIGNIFICANT CHANGE UP (ref 0–0.9)
MONOCYTES NFR BLD AUTO: 0.7 % — LOW (ref 2–14)
MONOCYTES NFR BLD AUTO: 0.7 % — LOW (ref 2–14)
NEUTROPHILS # BLD AUTO: 8.44 K/UL — HIGH (ref 1.8–7.4)
NEUTROPHILS # BLD AUTO: 8.44 K/UL — HIGH (ref 1.8–7.4)
NEUTROPHILS NFR BLD AUTO: 92.7 % — HIGH (ref 43–77)
NEUTROPHILS NFR BLD AUTO: 92.7 % — HIGH (ref 43–77)
NRBC # BLD: 0 /100 WBCS — SIGNIFICANT CHANGE UP (ref 0–0)
NRBC # BLD: 0 /100 WBCS — SIGNIFICANT CHANGE UP (ref 0–0)
PHOSPHATE SERPL-MCNC: 4.1 MG/DL — SIGNIFICANT CHANGE UP (ref 2.5–4.5)
PHOSPHATE SERPL-MCNC: 4.1 MG/DL — SIGNIFICANT CHANGE UP (ref 2.5–4.5)
PLATELET # BLD AUTO: 338 K/UL — SIGNIFICANT CHANGE UP (ref 150–400)
PLATELET # BLD AUTO: 338 K/UL — SIGNIFICANT CHANGE UP (ref 150–400)
POTASSIUM SERPL-MCNC: 4.6 MMOL/L — SIGNIFICANT CHANGE UP (ref 3.5–5.3)
POTASSIUM SERPL-MCNC: 4.6 MMOL/L — SIGNIFICANT CHANGE UP (ref 3.5–5.3)
POTASSIUM SERPL-SCNC: 4.6 MMOL/L — SIGNIFICANT CHANGE UP (ref 3.5–5.3)
POTASSIUM SERPL-SCNC: 4.6 MMOL/L — SIGNIFICANT CHANGE UP (ref 3.5–5.3)
PROT SERPL-MCNC: 6.9 G/DL — SIGNIFICANT CHANGE UP (ref 6–8.3)
PROT SERPL-MCNC: 6.9 G/DL — SIGNIFICANT CHANGE UP (ref 6–8.3)
RBC # BLD: 3.38 M/UL — LOW (ref 3.8–5.2)
RBC # BLD: 3.38 M/UL — LOW (ref 3.8–5.2)
RBC # FLD: 15.3 % — HIGH (ref 10.3–14.5)
RBC # FLD: 15.3 % — HIGH (ref 10.3–14.5)
SODIUM SERPL-SCNC: 142 MMOL/L — SIGNIFICANT CHANGE UP (ref 135–145)
SODIUM SERPL-SCNC: 142 MMOL/L — SIGNIFICANT CHANGE UP (ref 135–145)
WBC # BLD: 9.11 K/UL — SIGNIFICANT CHANGE UP (ref 3.8–10.5)
WBC # BLD: 9.11 K/UL — SIGNIFICANT CHANGE UP (ref 3.8–10.5)
WBC # FLD AUTO: 9.11 K/UL — SIGNIFICANT CHANGE UP (ref 3.8–10.5)
WBC # FLD AUTO: 9.11 K/UL — SIGNIFICANT CHANGE UP (ref 3.8–10.5)

## 2023-11-18 PROCEDURE — 99233 SBSQ HOSP IP/OBS HIGH 50: CPT

## 2023-11-18 PROCEDURE — 93880 EXTRACRANIAL BILAT STUDY: CPT | Mod: 26

## 2023-11-18 RX ORDER — ATORVASTATIN CALCIUM 80 MG/1
40 TABLET, FILM COATED ORAL AT BEDTIME
Refills: 0 | Status: DISCONTINUED | OUTPATIENT
Start: 2023-11-18 | End: 2023-11-20

## 2023-11-18 RX ORDER — LOSARTAN POTASSIUM 100 MG/1
25 TABLET, FILM COATED ORAL DAILY
Refills: 0 | Status: DISCONTINUED | OUTPATIENT
Start: 2023-11-18 | End: 2023-11-20

## 2023-11-18 RX ORDER — SODIUM CHLORIDE 0.65 %
1 AEROSOL, SPRAY (ML) NASAL
Refills: 0 | Status: DISCONTINUED | OUTPATIENT
Start: 2023-11-18 | End: 2023-11-20

## 2023-11-18 RX ADMIN — LOSARTAN POTASSIUM 25 MILLIGRAM(S): 100 TABLET, FILM COATED ORAL at 17:05

## 2023-11-18 RX ADMIN — LATANOPROST 1 DROP(S): 0.05 SOLUTION/ DROPS OPHTHALMIC; TOPICAL at 23:08

## 2023-11-18 RX ADMIN — PANTOPRAZOLE SODIUM 40 MILLIGRAM(S): 20 TABLET, DELAYED RELEASE ORAL at 05:58

## 2023-11-18 RX ADMIN — Medication 100 MILLIGRAM(S): at 23:08

## 2023-11-18 RX ADMIN — Medication 3 MILLIGRAM(S): at 20:31

## 2023-11-18 RX ADMIN — ATORVASTATIN CALCIUM 40 MILLIGRAM(S): 80 TABLET, FILM COATED ORAL at 20:32

## 2023-11-18 RX ADMIN — Medication 1 SPRAY(S): at 17:05

## 2023-11-18 RX ADMIN — ENOXAPARIN SODIUM 40 MILLIGRAM(S): 100 INJECTION SUBCUTANEOUS at 05:58

## 2023-11-18 NOTE — DISCHARGE NOTE PROVIDER - PROVIDER TOKENS
PROVIDER:[TOKEN:[[id number]],FOLLOWUP:[1-3 days],SCHEDULEDAPPTTIME:[11:30 AM],ESTABLISHEDPATIENT:[T]] PROVIDER:[TOKEN:[[id number]],FOLLOWUP:[1-3 days],SCHEDULEDAPPTTIME:[11:30 AM],ESTABLISHEDPATIENT:[T]],PROVIDER:[TOKEN:[[id number]],FOLLOWUP:[1 month],SCHEDULEDAPPTTIME:[12:00 PM]] PROVIDER:[TOKEN:[[id number]],FOLLOWUP:[1-3 days],SCHEDULEDAPPTTIME:[11:30 AM],ESTABLISHEDPATIENT:[T]],PROVIDER:[TOKEN:[[id number]],FOLLOWUP:[1 month]]

## 2023-11-18 NOTE — DISCHARGE NOTE PROVIDER - NSDCCPTREATMENT_GEN_ALL_CORE_FT
PRINCIPAL PROCEDURE  Procedure: MRI orbit wo then w contrast  Findings and Treatment: MRI ORBITS WITH AND WITHOUT CONTRAST  HISTORY: Suspected giant cell arteritis, blurry vision, headache  TECHNIQUE: Multiplanar multi-sequential MR imaging of the orbits was   obtained with and without administration of intravenous contrast.  Intravenous contrast:  COMPARISON: None.  FINDINGS:  The globes are fairly symmetric in size and shape. Status post cataract   surgery. There is no intraocular hemorrhage or gross mass. The lacrimal   glands are unremarkable. There is no significant inflammatory change or   gross mass in the retrobulbar fat. The extraocular muscles have the   appropriate MR signal and muscle bulk. The optic nerves are within normal   limits. The optic chiasm and visualized optic tracts are likewise within   normal limits.  The sella is unremarkable. The cavernous sinuses are within normal   limits. The ICA flow voids are well maintained. There is no gross   enhancement of the remaining cranial nerves.  There is no diffusion restriction to suggest acute infarct. A few   nonspecific foci of increased T2/FLAIR signal in the subcortical and deep   white matter. There is no hydrocephalus. The visualized extra axial   spaces and basal cisterns are within normal limits. There is no midline   shift or mass effect present. There is no abnormal enhancement within the   visualized brain parenchyma.  The craniocervical junction is within normal limits. The major   intracranial vessels demonstrate expected signal void related vascular   flow. Mild mucosal thickening to the ethmoid air cells. Bilateral mastoid   effusions.  Slight prominence to the vessels overlying the left temple without   evidence of significant inflammatory change or juliana intraluminal filling   defect.  IMPRESSION:  1.  Unremarkable examination of the orbits.  2.  Slight prominence to the vessels overlying the left temple without   evidence of significant inflammatory change or juliana intraluminal filling   defect.        SECONDARY PROCEDURE  Procedure: Duplex scan of carotid artery  Findings and Treatment: MRI ORBITS WITH AND WITHOUT CONTRAST  HISTORY: Suspected giant cell arteritis, blurry vision, headache  TECHNIQUE: Multiplanar multi-sequential MR imaging of the orbits was   obtained with and without administration of intravenous contrast.  Intravenous contrast:  COMPARISON: None.  FINDINGS:  The globes are fairly symmetric in size and shape. Status post cataract   surgery. There is no intraocular hemorrhage or gross mass. The lacrimal   glands are unremarkable. There is no significant inflammatory change or   gross mass in the retrobulbar fat. The extraocular muscles have the   appropriate MR signal and muscle bulk. The optic nerves are within normal   limits. The optic chiasm and visualized optic tracts are likewise within   normal limits.  The sella is unremarkable. The cavernous sinuses are within normal   limits. The ICA flow voids are well maintained. There is no gross   enhancement of the remaining cranial nerves.  There is no diffusion restriction to suggest acute infarct. A few   nonspecific foci of increased T2/FLAIR signal in the subcortical and deep   white matter. There is no hydrocephalus. The visualized extra axial   spaces and basal cisterns are within normal limits. There is no midline   shift or mass effect present. There is no abnormal enhancement within the   visualized brain parenchyma.  The craniocervical junction is within normal limits. The major   intracranial vessels demonstrate expected signal void related vascular   flow. Mild mucosal thickening to the ethmoid air cells. Bilateral mastoid   effusions.  Slight prominence to the vessels overlying the left temple without   evidence of significant inflammatory change or juliana intraluminal filling   defect.  IMPRESSION:  1.  Unremarkable examination of the orbits.  2.  Slight prominence to the vessels overlying the left temple without   evidence of significant inflammatory change or juliana intraluminal filling   defect.

## 2023-11-18 NOTE — PROGRESS NOTE ADULT - ATTENDING COMMENTS
78y F pmh htn, hld here for c/o persistent b/l temporal HA for weeks found to have elevated ESR/CRP  On O/P labs, c/f GCA, admitted for further eval       #GCA   - P/w bitemporal HA, noted to have elevated ESR/CRP O/P c/f GCA vs other vasculitis   - Here CRP 19, ESR 75  - s/p rheumatology evaluation, rec: - temporal artery biopsy                                                        - s/p 1 gram methylprednisolone 11/17 AM,  decrease steroid to 500 mg daily for 2 more days ( 11/18 and 11/19) then continue with prednisone orally 1 mg/kg daily                                                         - c/w Bactrim DS three times weekly                                                        - f/u MRI brain and orbits w/wo contrast                                                         - f/u Duplex US for carotid arteries   - s/p ophthalmology evaluation,  rec: no ophthalmic manifestations of GCA, elevated IOP--start Latanoprost   - s/p neurology evaluation   - Check tsh   - vascular surgery consulted for temporal artery biopsy, patient currently refused surgery     D/w Dr. Raina Call

## 2023-11-18 NOTE — PROGRESS NOTE ADULT - ASSESSMENT
79 y/o female with PMH of HTN, HLD presenting with sub-acute headache admitted for concern for GCA. 77 y/o female with PMH of HTN, HLD presenting with sub-acute headache admitted for concern for GCA. Currently on methylprednisolone. Rheum and ophtho recommending temporal artery biopsy.

## 2023-11-18 NOTE — CONSULT NOTE ADULT - ASSESSMENT
A 78 year old with PMHx of HTN presents with bilateral temporal headaches with elevated CRP and ESR. Vascular surgery is consulted for temporal artery biopsy. Patient is refusing surgery as she is "scared of bleeding and the complications"    Recommendations:  - Patient is refusing surgery.  - Patient is able to make her own decisions and signs her consents.  - Please call back when the patient is amenable for surgery.    Plans discussed with the fellow on behalf of the attending on call.    Vascular Surgery  [id number]

## 2023-11-18 NOTE — DISCHARGE NOTE PROVIDER - CARE PROVIDERS DIRECT ADDRESSES
,DirectAddress_Unknown ,DirectAddress_Unknown,[email or web address]

## 2023-11-18 NOTE — PROGRESS NOTE ADULT - PROBLEM SELECTOR PLAN 1
Weeks of temporal bilateral headache, slight blurred vision recently  CRP of 19, ESR of 75  Evaluated by ophthalmology and found not to have any disc edema or ophthalmologic manifestations of GCA on exam  Declined CT, MRI in ED. Daughter reports she had these done recently and they were unremarkable  Rheum consulted, appreciate recs  Plan:  -Methylprednisolone 1000mg x1 then 500mg x2 qd then prednisone 1mg/kg/day   -Pantoprazole 40mg daily for ppx on steroids  -Bactrim PCP prophylaxis  -Ongoing discussions about temporal artery biopsy as recommended by rheum and ophtho Weeks of temporal bilateral headache, slight blurred vision recently  CRP of 19, ESR of 75  Evaluated by ophthalmology and found not to have any disc edema or ophthalmologic manifestations of GCA on exam  Declined CT, MRI in ED. Daughter reports she had these done recently and they were unremarkable  Rheum consulted, appreciate recs  Plan:  -Methylprednisolone 1000mg x1 then 500mg x2 qd then prednisone 1mg/kg/day   -Pantoprazole 40mg daily for ppx on steroids  -Bactrim PCP prophylaxis  -Discussed with pt and daughter temporal artery biopsy, pt agreeable Weeks of temporal bilateral headache, slight blurred vision recently  CRP of 19, ESR of 75  Evaluated by ophthalmology and found not to have any disc edema or ophthalmologic manifestations of GCA on exam  Declined CT, MRI in ED. Daughter reports she had these done recently and they were unremarkable  Rheum consulted, appreciate recs  Plan:  -Methylprednisolone 1000mg x1 then 500mg x2 qd then prednisone 1mg/kg/day   -Pantoprazole 40mg daily for ppx on steroids  -Bactrim PCP prophylaxis  -vascular surgery consulted for temporal artery biopsy, patient currently refusing biopsy

## 2023-11-18 NOTE — CONSULT NOTE ADULT - SUBJECTIVE AND OBJECTIVE BOX
VASCULAR SURGERY CONSULT NOTE  --------------------------------------------------------------------------------------------  Patient is a 78y old  Female who presents with a chief complaint of Headaches (18 Nov 2023 07:54)    HPI:  77 y/o female with PMH of HTN, HLD presenting with "weeks" of bilateral temporal headache. She notes the headache is constant but waxes and wanes in intensity. The headache sometimes radiates to the back of her head. She denies any recent nausea, vomiting, photophobia, phonophobia, changes in sensation, weakness, fevers, chills, shortness of breath, or chest pain. Of note, patient was evaluated in the outpatient setting for her headache. Due to concern for GCA, ESR and CRP were sent and found to be elevated (ESR:90, CRP: 16). Due to difficulties scheduling outpatient biopsy and patient noting vision which was slightly more blurry than usual, she was sent to ED.    In ED, patient was vitally stable with labs significant for CRP of 19 and ESR of 75. She was given 1g of methylprednisolone in the ED. (17 Nov 2023 03:50)      ROS: 10-system review is otherwise negative except HPI above.      PAST MEDICAL & SURGICAL HISTORY:    FAMILY HISTORY:      SOCIAL HISTORY:      ALLERGIES: No Known Allergies      HOME MEDICATIONS:     CURRENT MEDICATIONS  MEDICATIONS (STANDING): atorvastatin 40 milliGRAM(s) Oral at bedtime  enoxaparin Injectable 40 milliGRAM(s) SubCutaneous every 24 hours  losartan 25 milliGRAM(s) Oral daily  methylPREDNISolone sodium succinate IVPB 500 milliGRAM(s) IV Intermittent daily  pantoprazole    Tablet 40 milliGRAM(s) Oral before breakfast  trimethoprim  160 mG/sulfamethoxazole 800 mG 1 Tablet(s) Oral <User Schedule>    MEDICATIONS (PRN):acetaminophen     Tablet .. 650 milliGRAM(s) Oral every 6 hours PRN Temp greater or equal to 38C (100.4F), Mild Pain (1 - 3)  aluminum hydroxide/magnesium hydroxide/simethicone Suspension 30 milliLiter(s) Oral every 4 hours PRN Dyspepsia  melatonin 3 milliGRAM(s) Oral at bedtime PRN Insomnia  ondansetron Injectable 4 milliGRAM(s) IV Push every 8 hours PRN Nausea and/or Vomiting    --------------------------------------------------------------------------------------------    Vitals:   T(C): 36.6 (11-18-23 @ 06:40), Max: 36.6 (11-17-23 @ 21:05)  HR: 75 (11-18-23 @ 06:40) (69 - 75)  BP: 115/64 (11-18-23 @ 06:40) (115/64 - 131/72)  RR: 20 (11-18-23 @ 06:40) (18 - 20)  SpO2: 98% (11-18-23 @ 06:40) (98% - 100%)  CAPILLARY BLOOD GLUCOSE        CAPILLARY BLOOD GLUCOSE      Height (cm): 160 (11-16 @ 19:43)  Weight (kg): 52.2 (11-16 @ 19:43)  BMI (kg/m2): 20.4 (11-16 @ 19:43)  BSA (m2): 1.53 (11-16 @ 19:43)    PHYSICAL EXAM:   General: NAD, sitting in the chair  Neuro: A+Ox3  Neck: Soft, supple  Cardio: RRR  Resp: Good effort  Vascular: palpable pulses bilaterally. Extremities are warm without overlying skin changes.   Musculoskeletal: All 4 extremities moving spontaneously, no limitations.  --------------------------------------------------------------------------------------------    LABS  CBC (11-18 @ 07:01)                              10.3<L>                         9.11    )----------------(  338        92.7<H>% Neutrophils, 6.0<L>% Lymphocytes, ANC: 8.44<H>                              32.2<L>  CBC (11-17 @ 07:01)                              10.0<L>                         9.01    )----------------(  309        --    % Neutrophils, --    % Lymphocytes, ANC: --                                  30.4<L>    BMP (11-18 @ 07:01)             142     |  104     |  31<H> 		Ca++ --      Ca 9.8                ---------------------------------( 169<H>		Mg 2.0                4.6     |  24      |  0.99  			Ph 4.1     BMP (11-17 @ 07:01)             141     |  104     |  24<H> 		Ca++ --      Ca 9.7                ---------------------------------( 176<H>		Mg 1.7                4.7     |  21<L>   |  0.81  			Ph 3.9       LFTs (11-18 @ 07:01)      TPro 6.9 / Alb 3.8 / TBili 0.5 / DBili -- / AST 19 / ALT 12 / AlkPhos 89  LFTs (11-17 @ 07:01)      TPro 6.5 / Alb 3.5 / TBili 0.4 / DBili -- / AST 18 / ALT 9<L> / AlkPhos 89  --------------------------------------------------------------------------------------------    MICROBIOLOGY  Urinalysis (11-18 @ 07:01):     Color:  / Appearance:  / SG:  / pH:  / Gluc: 169<H> / Ketones:  / Bili:  / Urobili:  / Protein : / Nitrites:  / Leuk.Est:  / RBC:  / WBC:  / Sq Epi:  / Non Sq Epi:  / Bacteria          --------------------------------------------------------------------------------------------

## 2023-11-18 NOTE — PROGRESS NOTE ADULT - SUBJECTIVE AND OBJECTIVE BOX
PROGRESS NOTE:   Authored by Oneyda Paris MD PGY-1  Internal Medicine      Patient is a 78y old  Female who presents with a chief complaint of Headaches (17 Nov 2023 14:41)      SUBJECTIVE / OVERNIGHT EVENTS: ***, patient seen and examined at bedside    MEDICATIONS  (STANDING):  enoxaparin Injectable 40 milliGRAM(s) SubCutaneous every 24 hours  latanoprost 0.005% Ophthalmic Solution 1 Drop(s) Both EYES at bedtime  methylPREDNISolone sodium succinate IVPB 500 milliGRAM(s) IV Intermittent daily  pantoprazole    Tablet 40 milliGRAM(s) Oral before breakfast  trimethoprim  160 mG/sulfamethoxazole 800 mG 1 Tablet(s) Oral <User Schedule>    MEDICATIONS  (PRN):  acetaminophen     Tablet .. 650 milliGRAM(s) Oral every 6 hours PRN Temp greater or equal to 38C (100.4F), Mild Pain (1 - 3)  aluminum hydroxide/magnesium hydroxide/simethicone Suspension 30 milliLiter(s) Oral every 4 hours PRN Dyspepsia  melatonin 3 milliGRAM(s) Oral at bedtime PRN Insomnia  ondansetron Injectable 4 milliGRAM(s) IV Push every 8 hours PRN Nausea and/or Vomiting      CAPILLARY BLOOD GLUCOSE        I&O's Summary      PHYSICAL EXAM:  Vital Signs Last 24 Hrs  T(C): 36.6 (18 Nov 2023 06:40), Max: 36.6 (17 Nov 2023 21:05)  T(F): 97.8 (18 Nov 2023 06:40), Max: 97.9 (17 Nov 2023 21:05)  HR: 75 (18 Nov 2023 06:40) (69 - 75)  BP: 115/64 (18 Nov 2023 06:40) (115/64 - 131/72)  RR: 20 (18 Nov 2023 06:40) (18 - 20)  SpO2: 98% (18 Nov 2023 06:40) (98% - 100%)    Parameters below as of 18 Nov 2023 06:40  Patient On (Oxygen Delivery Method): room air      CONSTITUTIONAL: Well-groomed, in no apparent distress  RESPIRATORY: Breathing comfortably; no dullness to percussion; lungs CTA without wheeze/rhonchi/rales  CARDIOVASCULAR: +S1S2, RRR, no M/G/R; pedal pulses full and symmetric; no lower extremity edema  GASTROINTESTINAL: No palpable masses or tenderness, +BS throughout, no rebound/guarding; no hepatosplenomegaly; no hernia palpated  SKIN: No rashes or ulcers noted  NEUROLOGIC: A+O x 3, CN II-XII intact; sensation intact in LEs b/l to light touch    LABS:                        10.3   9.11  )-----------( 338      ( 18 Nov 2023 07:01 )             32.2     11-18    142  |  104  |  31<H>  ----------------------------<  169<H>  4.6   |  24  |  0.99    Ca    9.8      18 Nov 2023 07:01  Phos  4.1     11-18  Mg     2.0     11-18    TPro  6.9  /  Alb  3.8  /  TBili  0.5  /  DBili  x   /  AST  19  /  ALT  12  /  AlkPhos  89  11-18         PROGRESS NOTE:   Authored by Oneyda Paris MD PGY-1  Internal Medicine      Patient is a 78y old  Female who presents with a chief complaint of Headaches (17 Nov 2023 14:41)      SUBJECTIVE / OVERNIGHT EVENTS: NAEO. Pt notes her vision continues to be slightly blurry, no worsening since yesterday. Otherwise no other symptoms or complaints. Seen and examined at bedside    MEDICATIONS  (STANDING):  enoxaparin Injectable 40 milliGRAM(s) SubCutaneous every 24 hours  latanoprost 0.005% Ophthalmic Solution 1 Drop(s) Both EYES at bedtime  methylPREDNISolone sodium succinate IVPB 500 milliGRAM(s) IV Intermittent daily  pantoprazole    Tablet 40 milliGRAM(s) Oral before breakfast  trimethoprim  160 mG/sulfamethoxazole 800 mG 1 Tablet(s) Oral <User Schedule>    MEDICATIONS  (PRN):  acetaminophen     Tablet .. 650 milliGRAM(s) Oral every 6 hours PRN Temp greater or equal to 38C (100.4F), Mild Pain (1 - 3)  aluminum hydroxide/magnesium hydroxide/simethicone Suspension 30 milliLiter(s) Oral every 4 hours PRN Dyspepsia  melatonin 3 milliGRAM(s) Oral at bedtime PRN Insomnia  ondansetron Injectable 4 milliGRAM(s) IV Push every 8 hours PRN Nausea and/or Vomiting      CAPILLARY BLOOD GLUCOSE        I&O's Summary      PHYSICAL EXAM:  Vital Signs Last 24 Hrs  T(C): 36.6 (18 Nov 2023 06:40), Max: 36.6 (17 Nov 2023 21:05)  T(F): 97.8 (18 Nov 2023 06:40), Max: 97.9 (17 Nov 2023 21:05)  HR: 75 (18 Nov 2023 06:40) (69 - 75)  BP: 115/64 (18 Nov 2023 06:40) (115/64 - 131/72)  RR: 20 (18 Nov 2023 06:40) (18 - 20)  SpO2: 98% (18 Nov 2023 06:40) (98% - 100%)    Parameters below as of 18 Nov 2023 06:40  Patient On (Oxygen Delivery Method): room air      CONSTITUTIONAL: Well-groomed, in no apparent distress  RESPIRATORY: Breathing comfortably; no dullness to percussion; lungs CTA without wheeze/rhonchi/rales  CARDIOVASCULAR: +S1S2, RRR, no M/G/R; pedal pulses full and symmetric; no lower extremity edema  GASTROINTESTINAL: No palpable masses or tenderness, +BS throughout, no rebound/guarding; no hepatosplenomegaly; no hernia palpated  SKIN: No rashes or ulcers noted  NEUROLOGIC: A+O x 3, CN II-XII intact; sensation intact in LEs b/l to light touch    LABS:                        10.3   9.11  )-----------( 338      ( 18 Nov 2023 07:01 )             32.2     11-18    142  |  104  |  31<H>  ----------------------------<  169<H>  4.6   |  24  |  0.99    Ca    9.8      18 Nov 2023 07:01  Phos  4.1     11-18  Mg     2.0     11-18    TPro  6.9  /  Alb  3.8  /  TBili  0.5  /  DBili  x   /  AST  19  /  ALT  12  /  AlkPhos  89  11-18

## 2023-11-18 NOTE — DISCHARGE NOTE PROVIDER - HOSPITAL COURSE
Ms Garcia is a 79yo woman with a hx of HTN, HLD presenting with sub-acute headache and visual deficits admitted to medicine for possible giant cell arteritis. She was experiencing weeks of bilateral temporal bilateral headache and started experiencing mild blurry vision of late. Was being worked up for GCA prior to coming into ED. Was on prednisone 20mg qd. Outpatient temporal artery biopsy scheduled for 11/22. Labs obtained showing CRP of 19, ESR of 75. S/p 1g of solumedrol in ED. Pt evaluated by ophthalmology and found not to have any disc edema or ophthalmologic manifestations of GCA on exam. She declined CT, MRI in ED. Daughter showed reports of Brain MRI at [facility] done recently showing minimal microvascular disease with global parenchymal volume loss. She was started on methylprednisolone 1000mg IV, pantoprazole 40mg qd, and bactrim for pcp prophylaxis. Rheumatology was consulted and recommended switching to methylprednisolone 500mg IV on 11/18 for 2 days then transition to 1mg/kg/day. Will be transitioned to 50mg qd. Rheum and ophtho recommending temporal artery biopsy. Vascular surgery consulted however pt unwilling to undergo procedure due to fear. *** Ms Garcia is a 79yo woman with a hx of HTN, HLD presenting with sub-acute headache and visual deficits admitted to medicine for possible giant cell arteritis. She was experiencing weeks of bilateral temporal bilateral headache and started experiencing mild blurry vision of late. Was being worked up for GCA prior to coming into ED. Was on prednisone 20mg qd complicated by GERD-like side effects. Outpatient temporal artery biopsy scheduled for 11/22. Labs obtained showing CRP of 19, ESR of 75. S/p 1g of solumedrol in ED. Pt evaluated by ophthalmology and found not to have any disc edema or ophthalmologic manifestations of GCA on exam. She declined CT, MRI in ED. Daughter showed reports of Brain MRI at [facility] done recently showing minimal microvascular disease with global parenchymal volume loss. She was started on methylprednisolone 1000mg IV, pantoprazole 40mg qd, and bactrim for pcp prophylaxis. Rheumatology was consulted and recommended switching to methylprednisolone 500mg IV on 11/18 for 2 days then transition to 1mg/kg/day. Will be transitioned to 50mg qd. Rheum and ophtho recommended temporal artery biopsy. Vascular surgery consulted however pt unwilling to undergo procedure due to fear. On hospital day 4 (11/19), pt consented to temporal artery biospy scheduled for 11/20. Pt also received MRI orbit with and without IV contrast which only showed slight prominence to the vessels overlying the left temple without evidence of significant inflammatory change or juliana intraluminal filling defect. The study was otherwise unremarkable. On discharge day (11/20), pt has been doing well overall but with mild improvement in headache. She is being discharged on prednisone 50mg, bactrim for pcp prophylaxis, and pantoprazole 40mg for her GERD symptoms with prednisone. Pt is to follow-up on biospy results outpatient with Dr. Hardy, has an appointment with her ophthomologist 11/21 with Dr. Ignacio Ross for f/u of glaucoma and macular degeneration. Pt is to promptly schedule appt with her neurologist outpatient as well. Ms Garcia is a 77yo woman with a hx of HTN, HLD presenting with sub-acute headache and visual deficits admitted to medicine for possible giant cell arteritis. She was experiencing weeks of bilateral temporal bilateral headache and started experiencing mild blurry vision of late. Was being worked up for GCA prior to coming into ED. Was on prednisone 20mg qd complicated by GERD-like side effects. Outpatient temporal artery biopsy scheduled for 11/22. Labs obtained showing CRP of 19, ESR of 75. S/p 1g of solumedrol in ED. Pt evaluated by ophthalmology and found not to have any disc edema or ophthalmologic manifestations of GCA on exam. She declined CT, MRI in ED. Daughter showed reports of Brain MRI at [facility] done recently showing minimal microvascular disease with global parenchymal volume loss. She was started on methylprednisolone 1000mg IV, pantoprazole 40mg qd, and bactrim for pcp prophylaxis. Rheumatology was consulted and recommended switching to methylprednisolone 500mg IV on 11/18 for 2 days then transition to 1mg/kg/day. Will be transitioned to 50mg qd. Rheum and ophtho recommended temporal artery biopsy. Vascular surgery consulted however pt unwilling to undergo procedure due to fear. On hospital day 4 (11/19), pt consented to temporal artery biospy scheduled for 11/20. Pt also received MRI orbit with and without IV contrast which only showed slight prominence to the vessels overlying the left temple without evidence of significant inflammatory change or juliana intraluminal filling defect. The study was otherwise unremarkable. On discharge day (11/20), pt has been doing well overall but with mild improvement in headache. She is being discharged on prednisone 50mg, bactrim for pcp prophylaxis, and pantoprazole 40mg for her GERD symptoms with prednisone. Pt is to follow-up on biospy results outpatient with Dr. Any Ortiz, has an appointment with her ophthalmologist 11/21 with Dr. Ignacio Ross for f/u of glaucoma and macular degeneration. Pt is to promptly schedule appt with her neurologist outpatient as well. Ms Garcia is a 77yo woman with a hx of HTN, HLD presenting with sub-acute headache and visual deficits admitted to medicine for possible giant cell arteritis. She was experiencing weeks of bilateral temporal bilateral headache and started experiencing mild blurry vision of late. Was being worked up for GCA prior to coming into ED. Was on prednisone 20mg qd complicated by GERD-like side effects. Outpatient temporal artery biopsy scheduled for 11/22. Labs obtained showing CRP of 19, ESR of 75. S/p 1g of solumedrol in ED. Pt evaluated by ophthalmology and found not to have any disc edema or ophthalmologic manifestations of GCA on exam. She declined CT, MRI in ED. Daughter showed reports of Brain MRI at [facility] done recently showing minimal microvascular disease with global parenchymal volume loss. She was started on methylprednisolone 1000mg IV, pantoprazole 40mg qd, and bactrim for pcp prophylaxis. Rheumatology was consulted and recommended switching to methylprednisolone 500mg IV on 11/18 for 2 days then transition to 1mg/kg/day. Will be transitioned to 50mg qd. Rheum and ophtho recommended temporal artery biopsy. Vascular surgery consulted however pt unwilling to undergo procedure due to fear. On hospital day 4 (11/19), pt consented to temporal artery biospy scheduled for 11/20. Pt also received MRI orbit with and without IV contrast which only showed slight prominence to the vessels overlying the left temple without evidence of significant inflammatory change or juliana intraluminal filling defect. The study was otherwise unremarkable. On discharge day (11/20), pt has been doing well overall but with mild improvement in headache. She is being discharged on prednisone 50mg, bactrim for pcp prophylaxis, and pantoprazole 40mg for her GERD symptoms with prednisone. Pt is to follow-up on biospy results outpatient with Dr. Any Ortiz on 12/15, has an appointment with her ophthalmologist 11/21 with Dr. Ignacio Ross for f/u of glaucoma and macular degeneration. Pt is to promptly schedule appt with her neurologist outpatient as well. Ms Garcia is a 77yo woman with a hx of HTN, HLD presenting with sub-acute headache and visual deficits admitted to medicine for possible giant cell arteritis. She was experiencing weeks of bilateral temporal bilateral headache and started experiencing mild blurry vision of late. Was being worked up for GCA prior to coming into ED. Was on prednisone 20mg qd complicated by GERD-like side effects. Outpatient temporal artery biopsy scheduled for 11/22. Labs obtained showing CRP of 19, ESR of 75. S/p 1g of solumedrol in ED. Pt evaluated by ophthalmology and found not to have any disc edema or ophthalmologic manifestations of GCA on exam. She declined CT, MRI in ED. Daughter showed reports of Brain MRI at [facility] done recently showing minimal microvascular disease with global parenchymal volume loss. She was started on methylprednisolone 1000mg IV, pantoprazole 40mg qd, and bactrim for pcp prophylaxis. Rheumatology was consulted and recommended switching to methylprednisolone 500mg IV on 11/18 for 2 days then transition to 1mg/kg/day. Will be transitioned to 50mg qd. Rheum and ophtho recommended temporal artery biopsy. Temporal artery biopsy without evidence of GCA however will continue steroids outpatient and patient to follow with rheum for further care. Pt also received MRI orbit with and without IV contrast which only showed slight prominence to the vessels overlying the left temple without evidence of significant inflammatory change or juliana intraluminal filling defect. CTA performed without evidence of flow limitation. The study was otherwise unremarkable. She is being discharged on prednisone 50mg, bactrim for pcp prophylaxis, and pantoprazole 40mg for her GERD symptoms with prednisone. Pt is to follow-up on biospy results outpatient with Dr. Any Ortiz on 12/15. Pt is to promptly schedule appt with her neurologist and ophthalmologist outpatient as well.

## 2023-11-18 NOTE — DISCHARGE NOTE PROVIDER - NSDCMRMEDTOKEN_GEN_ALL_CORE_FT
Crestor 10 mg oral tablet: 1 tab(s) orally once a day  losartan 25 mg oral tablet: 1 tab(s) orally once a day  pantoprazole 40 mg oral delayed release tablet: 1 tab(s) orally   Crestor 10 mg oral tablet: 1 tab(s) orally once a day  losartan 25 mg oral tablet: 1 tab(s) orally once a day  pantoprazole 40 mg oral delayed release tablet: 1 tab(s) orally  predniSONE 50 mg oral tablet: 1 tab(s) orally once a day  sulfamethoxazole-trimethoprim 800 mg-160 mg oral tablet: 1 tab(s) orally   Crestor 10 mg oral tablet: 1 tab(s) orally once a day  losartan 25 mg oral tablet: 1 tab(s) orally once a day  pantoprazole 40 mg oral delayed release tablet: 1 tab(s) orally once a day  predniSONE 10 mg oral tablet: 5 tab(s) orally once a day 50 mg (5 pills) until 11/26; 40 mg (4 pills) from 11/27-12/3, 30 mg (3 pills) from 12/4 until rheum appointment  sulfamethoxazole-trimethoprim 800 mg-160 mg oral tablet: 1 tab(s) orally Monday, Wednesday, and Friday   Crestor 10 mg oral tablet: 1 tab(s) orally once a day  losartan 25 mg oral tablet: 1 tab(s) orally once a day  pantoprazole 40 mg oral delayed release tablet: 1 tab(s) orally once a day  predniSONE 10 mg oral tablet: 5 tab(s) orally once a day 50 mg (5 pills) until 11/26; 40 mg (4 pills) from 11/27-12/3, 30 mg (3 pills) from 12/4 until rheum appointment  sulfamethoxazole-trimethoprim 800 mg-160 mg oral tablet: 1 tab(s) orally Monday, Wednesday, and Friday  Xalatan 0.005% ophthalmic solution: 1 drop(s) to each affected eye once a day (at bedtime)

## 2023-11-18 NOTE — DISCHARGE NOTE PROVIDER - NSDCCPCAREPLAN_GEN_ALL_CORE_FT
PRINCIPAL DISCHARGE DIAGNOSIS  Diagnosis: GCA (giant cell arteritis)  Assessment and Plan of Treatment: Giant cell arteritis is inflammation and damage to the blood vessels that supply blood to the head, neck, upper body and arms. It is also called temporal arteritis. Giant cell arteritis affects medium-to-large arteries. It causes inflammation, swelling, tenderness, and damage to the blood vessels that supply blood to the head, neck, upper body, and arms. It most commonly occurs in the arteries around the temples (temporal arteries). These arteries branch off from the carotid artery in the neck. In some cases, the condition can occur in medium-to-large arteries in other places in the body as well. The cause of the condition is unknown.  The best way to diagnose this condition is through temporal artery biopsy. Treatment for this condition is through steroids. You were started on high dose steroids and transitioned to oral prednisone 50mg. Continue taking this every day. Please follow up with rheumatology, ophthalmology, and neurology.     PRINCIPAL DISCHARGE DIAGNOSIS  Diagnosis: GCA (giant cell arteritis)  Assessment and Plan of Treatment: Giant cell arteritis is inflammation and damage to the blood vessels that supply blood to the head, neck, upper body and arms. It is also called temporal arteritis. Giant cell arteritis affects medium-to-large arteries. It causes inflammation, swelling, tenderness, and damage to the blood vessels that supply blood to the head, neck, upper body, and arms. It most commonly occurs in the arteries around the temples (temporal arteries). These arteries branch off from the carotid artery in the neck. In some cases, the condition can occur in medium-to-large arteries in other places in the body as well. The cause of the condition is unknown.  The best way to diagnose this condition is through temporal artery biopsy. Treatment for this condition is through steroids. You were started on high dose steroids and transitioned to oral prednisone 50mg. Please take prednisone 50mg from now until 11/27.   From 11/28-12/4 take prednisone 40mg daily.   From 12/5 going forward, take prednisone 30mg daily. Further steroid therapy will be discussed during your rheumatology appointment on 12/15.  Please follow up with:  Dr Any Ortiz of rheumatology on 12/15 at 1pm  Dr. Ignacio Ross of ophthalmology 11/21 @11:30 am   and your neurologist at [facility], Dr Fallon.     PRINCIPAL DISCHARGE DIAGNOSIS  Diagnosis: GCA (giant cell arteritis)  Assessment and Plan of Treatment: Giant cell arteritis is inflammation and damage to the blood vessels that supply blood to the head, neck, upper body and arms. It is also called temporal arteritis. Giant cell arteritis affects medium-to-large arteries. It causes inflammation, swelling, tenderness, and damage to the blood vessels that supply blood to the head, neck, upper body, and arms. It most commonly occurs in the arteries around the temples (temporal arteries). These arteries branch off from the carotid artery in the neck. In some cases, the condition can occur in medium-to-large arteries in other places in the body as well. The cause of the condition is unknown.  The best way to diagnose this condition is through temporal artery biopsy. Treatment for this condition is through steroids. You were started on high dose steroids and transitioned to oral prednisone 50mg. Please take prednisone 50mg (5 pills) from now until 11/26.  From 11/27-12/3 take prednisone 40mg (4 pills) daily.   From 12/4 going forward, take prednisone 30mg (3 pills) daily. Further steroid therapy will be discussed during your rheumatology appointment on 12/15.   Additionall, while you are on high dose steroids, please continue to stay bactrim antibiotics on Monday/Wednesday/Friday to prevent serious infection. Please take pantoprazole daily to prevent GI upset while on high dose steroids. Please take your steroids in the morning to avoid sleep disturbances.  Please follow up with:  Dr Any Ortiz of rheumatology on 12/15 at 1pm  Dr. Ignacio Ross of ophthalmology 11/21 @11:30 am   and your neurologist at [facility], Dr Fallon.

## 2023-11-18 NOTE — DISCHARGE NOTE PROVIDER - NSDCFUADDAPPT_GEN_ALL_CORE_FT
APPTS ARE READY TO BE MADE: [x ] YES    Best Family or Patient Contact (if needed): Daughter Loyda    Additional Information about above appointments (if needed):    1: [facility] - [place]  2: [facility]   3: Your neurologist Dr Fallon ([facility])    Other comments or requests:    APPTS ARE READY TO BE MADE: [x ] YES    Best Family or Patient Contact (if needed): Daughter Loyda    Additional Information about above appointments (if needed):    1: [facility] - [place]  2: Your ophthalmologist- Dr. Ignacio Ross 11/21 @ 11:30 am   3: Your neurologist Dr Fallon ([facility])    Other comments or requests:    APPTS ARE READY TO BE MADE: [x ] YES    Best Family or Patient Contact (if needed): Daughter Loyda    Additional Information about above appointments (if needed):    1: [facility] - Dr. Any Ortiz  2: Your ophthalmologist- Dr. Ignacio Ross 11/21 @ 11:30 am   3: Your neurologist Dr Fallon ([facility])    Other comments or requests:    APPTS ARE READY TO BE MADE: [x ] YES    Best Family or Patient Contact (if needed): Daughter Loyda    Additional Information about above appointments (if needed):    1: [facility] - Dr. Any Ortiz 12/15 @1pm  2: Your ophthalmologist- Dr. Ignacio Ross 11/21 @11:30 am   3: Your neurologist Dr Fallon ([facility])    Other comments or requests:    APPTS ARE READY TO BE MADE: [x ] YES    Best Family or Patient Contact (if needed): Daughter Loyda    Additional Information about above appointments (if needed):    1: [facility] - Dr. Any Ortiz 12/15 @1pm  2: Your ophthalmologist- Dr. Ignacio Ross 11/21 @11:30 am   3: Your neurologist Dr Fallon ([facility])    Other comments or requests:  Spoke with patient and advised that her daughter will call Dr. Ignacio Ross's office to get her rescheduled for an appointment as her daughter takes her to her appointments and needs to know her work schedule.

## 2023-11-18 NOTE — CONSULT NOTE ADULT - ATTENDING COMMENTS
Full consult note as above; discussed with surgery resident and vascular fellow.   She is a 77 y/o lady who has headaches and a very elevated ESR. She was started on steroids but her headaches got worse. Her ESR is still 75. She will be scheduled for a temporal artery biopsy very soon.
Patient was offered Icelandic , but she declined, pointing to her family members to translate for her  She speaks Icelandic and English    78 F with HTN, HLD, cervical cancer, confusion for 6-12 mo, hiatal hernia presented with HA and blurry vision at the recommendation of her neurologist.  There was reported bitemporal HA, weight loss, prior evaluations including elevated ESR and CRP, difficulty scheduling outpatient temporal artery biopsy. Was on prednisone 20 mg daily x10 days about 3 weeks ago as per family, but patient was having abdominal discomfort or GERD symptoms with prednisone and couldn't tolerate prednisone.    Patient encountered sitting on couch in room in street clothes, walking around. Family states she thinks she is at home. Family reports pt has been anxious and not sleeping well.  Pt reports HA and points to top of her head. Family endorses mild vision loss, R eye blurry vision x1 week, decreased appetite.    Exam 11/17:  Mild TTP over L temporal artery, denies TTP over R temporal artery  Awake, alert, follows commands and answers questions  Comprehension mildly impaired even with family assistance  PERRL, EOMI, face symmetric, no dysarthria  Moves all extremities symmetrically  Walks independently and energetically with narrow base. Normal toe-walk. Able to heel-walk slowly, reports some pain in heels. Unable to tandem.    Ophtho exam - 20/20 [facility] b/l, discs sharp, no ophthalmic manifestations of GCA    ESR 75 (ULN for age 44), CRP 19 (ULN for age 2.2)    Assessment:  Concern for GCA    Recommendations:  -plan for temporal artery biopsy  -steroid dosing as per rheumatology, appreciate recommendations. may need alternative agent if patient cannot tolerate steroids  -consider using PPI with steroids  -MRI brain and orbits w/wo con if not already done (can ask family to bring in a report of prior imaging - they did not have any at the time of my exam)  -consider melatonin for insomnia, and try to avoid steroid dosing near bedtime    Isabella Reyes MD
pt seen by me personally   agree w/ above   bx pending

## 2023-11-18 NOTE — DISCHARGE NOTE PROVIDER - CARE PROVIDER_API CALL
CONDAX, PETER  N  CONDAX PETER, Phys,    Phone: ()-  Fax: ()-  Established Patient  Follow Up Time: 1-3 days   CONDAX, PETER  N  CONDAX PETER, Phys,    Phone: ()-  Fax: ()-  Established Patient  Follow Up Time: 1-3 days    Any Vargas  Rheumatology  [address] [place] [id number]  Phone: [phone]  Fax: [phone]  Follow Up Time: 1 month

## 2023-11-19 ENCOUNTER — TRANSCRIPTION ENCOUNTER (OUTPATIENT)
Age: 78
End: 2023-11-19

## 2023-11-19 DIAGNOSIS — Z01.818 ENCOUNTER FOR OTHER PREPROCEDURAL EXAMINATION: ICD-10-CM

## 2023-11-19 LAB
ALBUMIN SERPL ELPH-MCNC: 3.3 G/DL — SIGNIFICANT CHANGE UP (ref 3.3–5)
ALBUMIN SERPL ELPH-MCNC: 3.3 G/DL — SIGNIFICANT CHANGE UP (ref 3.3–5)
ALP SERPL-CCNC: 79 U/L — SIGNIFICANT CHANGE UP (ref 40–120)
ALP SERPL-CCNC: 79 U/L — SIGNIFICANT CHANGE UP (ref 40–120)
ALT FLD-CCNC: 11 U/L — SIGNIFICANT CHANGE UP (ref 10–45)
ALT FLD-CCNC: 11 U/L — SIGNIFICANT CHANGE UP (ref 10–45)
AST SERPL-CCNC: 17 U/L — SIGNIFICANT CHANGE UP (ref 10–40)
AST SERPL-CCNC: 17 U/L — SIGNIFICANT CHANGE UP (ref 10–40)
BILIRUB SERPL-MCNC: 0.4 MG/DL — SIGNIFICANT CHANGE UP (ref 0.2–1.2)
BILIRUB SERPL-MCNC: 0.4 MG/DL — SIGNIFICANT CHANGE UP (ref 0.2–1.2)
BUN SERPL-MCNC: 39 MG/DL — HIGH (ref 7–23)
BUN SERPL-MCNC: 39 MG/DL — HIGH (ref 7–23)
CALCIUM SERPL-MCNC: 9.1 MG/DL — SIGNIFICANT CHANGE UP (ref 8.4–10.5)
CALCIUM SERPL-MCNC: 9.1 MG/DL — SIGNIFICANT CHANGE UP (ref 8.4–10.5)
CHLORIDE SERPL-SCNC: 105 MMOL/L — SIGNIFICANT CHANGE UP (ref 96–108)
CHLORIDE SERPL-SCNC: 105 MMOL/L — SIGNIFICANT CHANGE UP (ref 96–108)
CO2 SERPL-SCNC: 22 MMOL/L — SIGNIFICANT CHANGE UP (ref 22–31)
CO2 SERPL-SCNC: 22 MMOL/L — SIGNIFICANT CHANGE UP (ref 22–31)
CREAT SERPL-MCNC: 1.1 MG/DL — SIGNIFICANT CHANGE UP (ref 0.5–1.3)
CREAT SERPL-MCNC: 1.1 MG/DL — SIGNIFICANT CHANGE UP (ref 0.5–1.3)
EGFR: 51 ML/MIN/1.73M2 — LOW
EGFR: 51 ML/MIN/1.73M2 — LOW
GLUCOSE SERPL-MCNC: 147 MG/DL — HIGH (ref 70–99)
GLUCOSE SERPL-MCNC: 147 MG/DL — HIGH (ref 70–99)
HCT VFR BLD CALC: 28.9 % — LOW (ref 34.5–45)
HCT VFR BLD CALC: 28.9 % — LOW (ref 34.5–45)
HGB BLD-MCNC: 9.4 G/DL — LOW (ref 11.5–15.5)
HGB BLD-MCNC: 9.4 G/DL — LOW (ref 11.5–15.5)
MAGNESIUM SERPL-MCNC: 2 MG/DL — SIGNIFICANT CHANGE UP (ref 1.6–2.6)
MAGNESIUM SERPL-MCNC: 2 MG/DL — SIGNIFICANT CHANGE UP (ref 1.6–2.6)
MCHC RBC-ENTMCNC: 31 PG — SIGNIFICANT CHANGE UP (ref 27–34)
MCHC RBC-ENTMCNC: 31 PG — SIGNIFICANT CHANGE UP (ref 27–34)
MCHC RBC-ENTMCNC: 32.5 GM/DL — SIGNIFICANT CHANGE UP (ref 32–36)
MCHC RBC-ENTMCNC: 32.5 GM/DL — SIGNIFICANT CHANGE UP (ref 32–36)
MCV RBC AUTO: 95.4 FL — SIGNIFICANT CHANGE UP (ref 80–100)
MCV RBC AUTO: 95.4 FL — SIGNIFICANT CHANGE UP (ref 80–100)
NRBC # BLD: 0 /100 WBCS — SIGNIFICANT CHANGE UP (ref 0–0)
NRBC # BLD: 0 /100 WBCS — SIGNIFICANT CHANGE UP (ref 0–0)
PHOSPHATE SERPL-MCNC: 3.6 MG/DL — SIGNIFICANT CHANGE UP (ref 2.5–4.5)
PHOSPHATE SERPL-MCNC: 3.6 MG/DL — SIGNIFICANT CHANGE UP (ref 2.5–4.5)
PLATELET # BLD AUTO: 319 K/UL — SIGNIFICANT CHANGE UP (ref 150–400)
PLATELET # BLD AUTO: 319 K/UL — SIGNIFICANT CHANGE UP (ref 150–400)
POTASSIUM SERPL-MCNC: 4.6 MMOL/L — SIGNIFICANT CHANGE UP (ref 3.5–5.3)
POTASSIUM SERPL-MCNC: 4.6 MMOL/L — SIGNIFICANT CHANGE UP (ref 3.5–5.3)
POTASSIUM SERPL-SCNC: 4.6 MMOL/L — SIGNIFICANT CHANGE UP (ref 3.5–5.3)
POTASSIUM SERPL-SCNC: 4.6 MMOL/L — SIGNIFICANT CHANGE UP (ref 3.5–5.3)
PROT SERPL-MCNC: 6.2 G/DL — SIGNIFICANT CHANGE UP (ref 6–8.3)
PROT SERPL-MCNC: 6.2 G/DL — SIGNIFICANT CHANGE UP (ref 6–8.3)
RBC # BLD: 3.03 M/UL — LOW (ref 3.8–5.2)
RBC # BLD: 3.03 M/UL — LOW (ref 3.8–5.2)
RBC # FLD: 15.6 % — HIGH (ref 10.3–14.5)
RBC # FLD: 15.6 % — HIGH (ref 10.3–14.5)
SODIUM SERPL-SCNC: 141 MMOL/L — SIGNIFICANT CHANGE UP (ref 135–145)
SODIUM SERPL-SCNC: 141 MMOL/L — SIGNIFICANT CHANGE UP (ref 135–145)
TSH SERPL-MCNC: 1.28 UIU/ML — SIGNIFICANT CHANGE UP (ref 0.27–4.2)
TSH SERPL-MCNC: 1.28 UIU/ML — SIGNIFICANT CHANGE UP (ref 0.27–4.2)
WBC # BLD: 6.55 K/UL — SIGNIFICANT CHANGE UP (ref 3.8–10.5)
WBC # BLD: 6.55 K/UL — SIGNIFICANT CHANGE UP (ref 3.8–10.5)
WBC # FLD AUTO: 6.55 K/UL — SIGNIFICANT CHANGE UP (ref 3.8–10.5)
WBC # FLD AUTO: 6.55 K/UL — SIGNIFICANT CHANGE UP (ref 3.8–10.5)

## 2023-11-19 PROCEDURE — 99233 SBSQ HOSP IP/OBS HIGH 50: CPT

## 2023-11-19 RX ADMIN — Medication 3 MILLIGRAM(S): at 22:51

## 2023-11-19 RX ADMIN — ATORVASTATIN CALCIUM 40 MILLIGRAM(S): 80 TABLET, FILM COATED ORAL at 22:51

## 2023-11-19 RX ADMIN — PANTOPRAZOLE SODIUM 40 MILLIGRAM(S): 20 TABLET, DELAYED RELEASE ORAL at 06:02

## 2023-11-19 RX ADMIN — Medication 100 MILLIGRAM(S): at 22:51

## 2023-11-19 RX ADMIN — Medication 1 SPRAY(S): at 17:28

## 2023-11-19 RX ADMIN — Medication 1 SPRAY(S): at 06:02

## 2023-11-19 RX ADMIN — LOSARTAN POTASSIUM 25 MILLIGRAM(S): 100 TABLET, FILM COATED ORAL at 17:28

## 2023-11-19 RX ADMIN — ENOXAPARIN SODIUM 40 MILLIGRAM(S): 100 INJECTION SUBCUTANEOUS at 06:02

## 2023-11-19 RX ADMIN — LATANOPROST 1 DROP(S): 0.05 SOLUTION/ DROPS OPHTHALMIC; TOPICAL at 22:51

## 2023-11-19 NOTE — PROGRESS NOTE ADULT - ASSESSMENT
A 78 year old with PMHx of HTN presents with bilateral temporal headaches with elevated CRP and ESR. Vascular surgery is consulted for temporal artery biopsy. Pt previously refusing surgery. Discussed procedure with pt last night and pt now amenable to the idea of surgery.    Recommendations:  - Pt scheduled for TAB tomorrow in OR  - NPO at Mn  - Pre op labs at 4am  - Please page vascular surgery with any questions/concerns  - Rest of care per primary     Vascular Surgery  [id number] A 78 year old with PMHx of HTN presents with bilateral temporal headaches with elevated CRP and ESR. Vascular surgery is consulted for temporal artery biopsy. Pt previously refusing surgery. Discussed procedure with pt last night and pt now amenable to the idea of surgery.    Recommendations:  - Pt scheduled for TAB tomorrow in OR  - Please document medical and cardiac optimization status and risk stratification today  - NPO at Mn  - Pre op labs at 4am  - Please page vascular surgery with any questions/concerns  - Rest of care per primary     Vascular Surgery  [id number]

## 2023-11-19 NOTE — PROGRESS NOTE ADULT - SUBJECTIVE AND OBJECTIVE BOX
SURGERY PROGRESS NOTE    Other giant cell arteritis        PATTI HARRIS  |  [id number]        S: VERONIQUE overnight. Pt seen and evaluated bedside this AM. Pt resting comfortably on exam. Tolerating Diet. Pain well controlled.    MEDICATIONS  (STANDING):  atorvastatin 40 milliGRAM(s) Oral at bedtime  enoxaparin Injectable 40 milliGRAM(s) SubCutaneous every 24 hours  latanoprost 0.005% Ophthalmic Solution 1 Drop(s) Both EYES at bedtime  losartan 25 milliGRAM(s) Oral daily  methylPREDNISolone sodium succinate IVPB 500 milliGRAM(s) IV Intermittent daily  pantoprazole    Tablet 40 milliGRAM(s) Oral before breakfast  sodium chloride 0.65% Nasal 1 Spray(s) Both Nostrils two times a day  trimethoprim  160 mG/sulfamethoxazole 800 mG 1 Tablet(s) Oral <User Schedule>    MEDICATIONS  (PRN):  acetaminophen     Tablet .. 650 milliGRAM(s) Oral every 6 hours PRN Temp greater or equal to 38C (100.4F), Mild Pain (1 - 3)  aluminum hydroxide/magnesium hydroxide/simethicone Suspension 30 milliLiter(s) Oral every 4 hours PRN Dyspepsia  melatonin 3 milliGRAM(s) Oral at bedtime PRN Insomnia  ondansetron Injectable 4 milliGRAM(s) IV Push every 8 hours PRN Nausea and/or Vomiting      O:   Vital Signs Last 24 Hrs  T(C): 36.6 (19 Nov 2023 05:12), Max: 36.7 (18 Nov 2023 13:14)  T(F): 97.9 (19 Nov 2023 05:12), Max: 98.1 (18 Nov 2023 21:18)  HR: 72 (19 Nov 2023 05:12) (60 - 74)  BP: 105/66 (19 Nov 2023 05:12) (105/66 - 137/71)  BP(mean): --  RR: 18 (19 Nov 2023 05:12) (18 - 20)  SpO2: 98% (19 Nov 2023 05:12) (95% - 98%)    Parameters below as of 19 Nov 2023 05:12  Patient On (Oxygen Delivery Method): room air        PHYSICAL EXAM:  GENERAL: NAD, well-groomed, well-developed  HEENT: NC/AT  CHEST/LUNG: Breathing even, unlabored  HEART: Regular rate and rhythm  ABDOMEN: Soft, nondistended.  EXTREMITIES: good distal pulses b/l   NEURO:  No focal deficits                          9.4    6.55  )-----------( 319      ( 19 Nov 2023 07:11 )             28.9     11-19    141  |  105  |  39<H>  ----------------------------<  147<H>  4.6   |  22  |  1.10    Ca    9.1      19 Nov 2023 07:09  Phos  3.6     11-19  Mg     2.0     11-19    TPro  6.2  /  Alb  3.3  /  TBili  0.4  /  DBili  x   /  AST  17  /  ALT  11  /  AlkPhos  79  11-19          IMAGING STUDIES:

## 2023-11-19 NOTE — PROGRESS NOTE ADULT - ATTENDING COMMENTS
78y F pmh htn, hld here for c/o persistent b/l temporal HA for weeks found to have elevated ESR/CRP  On O/P labs, c/f GCA, admitted for further eval       #GCA   - P/w bitemporal HA, noted to have elevated ESR/CRP O/P c/f GCA vs other vasculitis   - Here CRP 19, ESR 75  - s/p rheumatology evaluation, rec: - temporal artery biopsy                                                        - s/p 1 gram methylprednisolone 11/17 AM,  decrease steroid to 500 mg daily for 2 more days ( 11/18 and 11/19) then continue with prednisone orally 1 mg/kg daily                                                         - c/w Bactrim DS three times weekly                                                        - f/u MRI brain and orbits w/wo contrast ---- Declined CT, MRI in ED. Daughter reports she had these done recently and they were unremarkable, request daughter to bring outpatient image results                                                         - f/u Duplex US for carotid arteries ---- No significant hemodynamic stenosis of either carotid artery.   - s/p ophthalmology evaluation,  rec: no ophthalmic manifestations of GCA, elevated IOP--start Latanoprost   - s/p neurology evaluation   - TSH WNL   - s/p vascular surgery re- discussion with patient, patient now agrees with temporal artery biopsy, tentatively planned for Monday 11/20     D/w Dr. Abreu

## 2023-11-19 NOTE — PROGRESS NOTE ADULT - PROBLEM SELECTOR PLAN 6
DVT proph: Lovenox  Diet: Regular  Activity: Normal  GOC: Full code Patient planned for temporal artery bx 11/20/23.  Pt w/ >10METS  RCRI=0. Patint is low risk for low risk procedure.   Patient is medically optimized and no further w/u prior to planned biopsy   Hold losartan 11/20/23 Patient planned for temporal artery bx 11/20/23.  Pt w/ >10METS. No active chest pain. Pt w/o any cardiac history.   RCRI=0. Patint is low risk for low risk procedure.   Patient is medically optimized and no further w/u prior to planned biopsy   Hold losartan 11/20/23

## 2023-11-19 NOTE — PROGRESS NOTE ADULT - ASSESSMENT
79 y/o female with PMH of HTN, HLD presenting with sub-acute headache admitted for concern for GCA. Currently on methylprednisolone. Rheum and ophtho recommending temporal artery biopsy.

## 2023-11-19 NOTE — PROGRESS NOTE ADULT - PROBLEM SELECTOR PLAN 1
Weeks of temporal bilateral headache, slight blurred vision recently  CRP of 19, ESR of 75  Evaluated by ophthalmology and found not to have any disc edema or ophthalmologic manifestations of GCA on exam  Declined CT, MRI in ED. Daughter reports she had these done recently and they were unremarkable  Rheum consulted, appreciate recs  Plan:  -Methylprednisolone 1000mg x1 then 500mg x2 qd then prednisone 1mg/kg/day   -Pantoprazole 40mg daily for ppx on steroids  -Bactrim PCP prophylaxis  -vascular surgery consulted for temporal artery biopsy, patient currently refusing biopsy Weeks of temporal bilateral headache, slight blurred vision recently  CRP of 19, ESR of 75  Evaluated by ophthalmology and found not to have any disc edema or ophthalmologic manifestations of GCA on exam  Declined CT, MRI in ED. Daughter reports she had these done recently and they were unremarkable  Rheum consulted, appreciate recs  Plan:  -Methylprednisolone 1000mg x1 then 500mg x2 qd then prednisone 1mg/kg/day   -Pantoprazole 40mg daily for ppx on steroids  -Bactrim PCP prophylaxis  -vascular surgery consulted for temporal artery biopsy, patient reportedly agreable to bx per vascular 11/19

## 2023-11-19 NOTE — PROGRESS NOTE ADULT - SUBJECTIVE AND OBJECTIVE BOX
Deshawn Abreu, PGY1    PATTI HARRIS  78y  Female    Complaints:  Subjective:     No acute o/n events. Pt denies subj fevers/chills, HA, dizziness, chest pain, palpitations, n/v, abd pain, dysuria, diarrhea      FAMILY HISTORY:    78yVital Signs Last 24 Hrs  T(C): 36.6 (19 Nov 2023 05:12), Max: 36.7 (18 Nov 2023 13:14)  T(F): 97.9 (19 Nov 2023 05:12), Max: 98.1 (18 Nov 2023 21:18)  HR: 72 (19 Nov 2023 05:12) (60 - 74)  BP: 105/66 (19 Nov 2023 05:12) (105/66 - 137/71)  BP(mean): --  RR: 18 (19 Nov 2023 05:12) (18 - 20)  SpO2: 98% (19 Nov 2023 05:12) (95% - 98%)    Parameters below as of 19 Nov 2023 05:12  Patient On (Oxygen Delivery Method): room air          PHYSICAL EXAM:  GENERAL: NAD, well-groomed, well-developed  HEAD:  Atraumatic, Normocephalic  EYES: EOMI, PERRLA, conjunctiva and sclera clear  ENMT: No tonsillar erythema, exudates, or enlargement; Moist mucous membranes, Good dentition, No lesions  NECK: Supple, No JVD, Normal thyroid  NERVOUS SYSTEM:  Alert & Oriented X3, Good concentration; Motor Strength 5/5 B/L upper and lower extremities; DTRs 2+ intact and symmetric  CHEST/LUNG: Clear to percussion bilaterally; No rales, rhonchi, wheezing, or rubs  HEART: Regular rate and rhythm; No murmurs, rubs, or gallops  ABDOMEN: Soft, Nontender, Nondistended; Bowel sounds present  EXTREMITIES:  2+ Peripheral Pulses, No clubbing, cyanosis, or edema  LYMPH: No lymphadenopathy noted  SKIN: No rashes or lesions      Consultant(s) Notes Reviewed:  [x ] YES  [ ] NO  Care Discussed with Consultants/Other Providers [ x] YES  [ ] NO    LABS:                Female  RADIOLOGY & ADDITIONAL TESTS:    Imaging Personally Reviewed:  [ ] YES  [ ] NO    MedsMEDICATIONS  (STANDING):  atorvastatin 40 milliGRAM(s) Oral at bedtime  enoxaparin Injectable 40 milliGRAM(s) SubCutaneous every 24 hours  latanoprost 0.005% Ophthalmic Solution 1 Drop(s) Both EYES at bedtime  losartan 25 milliGRAM(s) Oral daily  methylPREDNISolone sodium succinate IVPB 500 milliGRAM(s) IV Intermittent daily  pantoprazole    Tablet 40 milliGRAM(s) Oral before breakfast  sodium chloride 0.65% Nasal 1 Spray(s) Both Nostrils two times a day  trimethoprim  160 mG/sulfamethoxazole 800 mG 1 Tablet(s) Oral <User Schedule>    MEDICATIONS  (PRN):  acetaminophen     Tablet .. 650 milliGRAM(s) Oral every 6 hours PRN Temp greater or equal to 38C (100.4F), Mild Pain (1 - 3)  aluminum hydroxide/magnesium hydroxide/simethicone Suspension 30 milliLiter(s) Oral every 4 hours PRN Dyspepsia  melatonin 3 milliGRAM(s) Oral at bedtime PRN Insomnia  ondansetron Injectable 4 milliGRAM(s) IV Push every 8 hours PRN Nausea and/or Vomiting      HEALTH ISSUES - PROBLEM Dx:  GCA (giant cell arteritis)    Glaucoma    Hypertension    Hyperlipidemia    Macular degeneration    Prophylactic measure    Blurred vision    Daily headache    Daily headache    Elevated sedimentation rate                   Deshawn Abreu, PGY1    PATTI HARRIS  78y  Female    Complaints:  Subjective:     No acute o/n events. Pt denies subj fevers/chills, HA, dizziness, chest pain, palpitations, n/v, abd pain, dysuria, diarrhea      FAMILY HISTORY:    78yVital Signs Last 24 Hrs  T(C): 36.6 (19 Nov 2023 05:12), Max: 36.7 (18 Nov 2023 13:14)  T(F): 97.9 (19 Nov 2023 05:12), Max: 98.1 (18 Nov 2023 21:18)  HR: 72 (19 Nov 2023 05:12) (60 - 74)  BP: 105/66 (19 Nov 2023 05:12) (105/66 - 137/71)  BP(mean): --  RR: 18 (19 Nov 2023 05:12) (18 - 20)  SpO2: 98% (19 Nov 2023 05:12) (95% - 98%)    Parameters below as of 19 Nov 2023 05:12  Patient On (Oxygen Delivery Method): room air          PHYSICAL EXAM:  CONSTITUTIONAL: Well-groomed, in no apparent distress  RESPIRATORY: Breathing comfortably; no dullness to percussion; lungs CTA without wheeze/rhonchi/rales  CARDIOVASCULAR: +S1S2, RRR, no M/G/R; pedal pulses full and symmetric; no lower extremity edema  GASTROINTESTINAL: No palpable masses or tenderness, +BS throughout, no rebound/guarding; no hepatosplenomegaly; no hernia palpated  SKIN: No rashes or ulcers noted  NEUROLOGIC: A+O x 3, CN II-XII intact; sensation intact in LEs b/l to light touch          Consultant(s) Notes Reviewed:  [x ] YES  [ ] NO  Care Discussed with Consultants/Other Providers [ x] YES  [ ] NO    LABS:                          9.4    6.55  )-----------( 319      ( 19 Nov 2023 07:11 )             28.9       11-19    141  |  105  |  39<H>  ----------------------------<  147<H>  4.6   |  22  |  1.10    Ca    9.1      19 Nov 2023 07:09  Phos  3.6     11-19  Mg     2.0     11-19    TPro  6.2  /  Alb  3.3  /  TBili  0.4  /  DBili  x   /  AST  17  /  ALT  11  /  AlkPhos  79  11-19              Urinalysis Basic - ( 19 Nov 2023 07:09 )    Color: x / Appearance: x / SG: x / pH: x  Gluc: 147 mg/dL / Ketone: x  / Bili: x / Urobili: x   Blood: x / Protein: x / Nitrite: x   Leuk Esterase: x / RBC: x / WBC x   Sq Epi: x / Non Sq Epi: x / Bacteria: x                  CAPILLARY BLOOD GLUCOSE                          Female  RADIOLOGY & ADDITIONAL TESTS:    Imaging Personally Reviewed:  [ ] YES  [ ] NO    MedsMEDICATIONS  (STANDING):  atorvastatin 40 milliGRAM(s) Oral at bedtime  enoxaparin Injectable 40 milliGRAM(s) SubCutaneous every 24 hours  latanoprost 0.005% Ophthalmic Solution 1 Drop(s) Both EYES at bedtime  losartan 25 milliGRAM(s) Oral daily  methylPREDNISolone sodium succinate IVPB 500 milliGRAM(s) IV Intermittent daily  pantoprazole    Tablet 40 milliGRAM(s) Oral before breakfast  sodium chloride 0.65% Nasal 1 Spray(s) Both Nostrils two times a day  trimethoprim  160 mG/sulfamethoxazole 800 mG 1 Tablet(s) Oral <User Schedule>    MEDICATIONS  (PRN):  acetaminophen     Tablet .. 650 milliGRAM(s) Oral every 6 hours PRN Temp greater or equal to 38C (100.4F), Mild Pain (1 - 3)  aluminum hydroxide/magnesium hydroxide/simethicone Suspension 30 milliLiter(s) Oral every 4 hours PRN Dyspepsia  melatonin 3 milliGRAM(s) Oral at bedtime PRN Insomnia  ondansetron Injectable 4 milliGRAM(s) IV Push every 8 hours PRN Nausea and/or Vomiting      HEALTH ISSUES - PROBLEM Dx:  GCA (giant cell arteritis)    Glaucoma    Hypertension    Hyperlipidemia    Macular degeneration    Prophylactic measure    Blurred vision    Daily headache    Daily headache    Elevated sedimentation rate                   Deshawn Abreu, PGY1    PATTI HARRIS  78y  Female    Complaints:  Subjective:     No acute o/n events. Pt denies subj fevers/chills, HA, dizziness, chest pain, palpitations, n/v, abd pain, dysuria, diarrhea      FAMILY HISTORY:    78yVital Signs Last 24 Hrs  T(C): 36.6 (19 Nov 2023 05:12), Max: 36.7 (18 Nov 2023 13:14)  T(F): 97.9 (19 Nov 2023 05:12), Max: 98.1 (18 Nov 2023 21:18)  HR: 72 (19 Nov 2023 05:12) (60 - 74)  BP: 105/66 (19 Nov 2023 05:12) (105/66 - 137/71)  BP(mean): --  RR: 18 (19 Nov 2023 05:12) (18 - 20)  SpO2: 98% (19 Nov 2023 05:12) (95% - 98%)    Parameters below as of 19 Nov 2023 05:12  Patient On (Oxygen Delivery Method): room air          PHYSICAL EXAM:  CONSTITUTIONAL: Well-groomed, in no apparent distress  RESPIRATORY: Breathing comfortably; no dullness to percussion; lungs CTA without wheeze/rhonchi/rales  CARDIOVASCULAR: +S1S2, RRR, no M/G/R; pedal pulses full and symmetric; no lower extremity edema  GASTROINTESTINAL: No palpable masses or tenderness, +BS throughout, no rebound/guarding; no hepatosplenomegaly; no hernia palpated  SKIN: No rashes or ulcers noted  NEUROLOGIC: A+O x 3, CN II-XII intact; sensation intact in LEs b/l to light touch          Consultant(s) Notes Reviewed:  [x ] YES  [ ] NO  Care Discussed with Consultants/Other Providers [ x] YES  [ ] NO    LABS:                          9.4    6.55  )-----------( 319      ( 19 Nov 2023 07:11 )             28.9       11-19    141  |  105  |  39<H>  ----------------------------<  147<H>  4.6   |  22  |  1.10    Ca    9.1      19 Nov 2023 07:09  Phos  3.6     11-19  Mg     2.0     11-19    TPro  6.2  /  Alb  3.3  /  TBili  0.4  /  DBili  x   /  AST  17  /  ALT  11  /  AlkPhos  79  11-19              Urinalysis Basic - ( 19 Nov 2023 07:09 )    Color: x / Appearance: x / SG: x / pH: x  Gluc: 147 mg/dL / Ketone: x  / Bili: x / Urobili: x   Blood: x / Protein: x / Nitrite: x   Leuk Esterase: x / RBC: x / WBC x   Sq Epi: x / Non Sq Epi: x / Bacteria: x                  CAPILLARY BLOOD GLUCOSE                          Female  RADIOLOGY & ADDITIONAL TESTS:    Imaging Personally Reviewed:  [ ] YES  [ ] NO    MedsMEDICATIONS  (STANDING):  atorvastatin 40 milliGRAM(s) Oral at bedtime  enoxaparin Injectable 40 milliGRAM(s) SubCutaneous every 24 hours  latanoprost 0.005% Ophthalmic Solution 1 Drop(s) Both EYES at bedtime  losartan 25 milliGRAM(s) Oral daily  methylPREDNISolone sodium succinate IVPB 500 milliGRAM(s) IV Intermittent daily  pantoprazole    Tablet 40 milliGRAM(s) Oral before breakfast  sodium chloride 0.65% Nasal 1 Spray(s) Both Nostrils two times a day  trimethoprim  160 mG/sulfamethoxazole 800 mG 1 Tablet(s) Oral <User Schedule>    MEDICATIONS  (PRN):  acetaminophen     Tablet .. 650 milliGRAM(s) Oral every 6 hours PRN Temp greater or equal to 38C (100.4F), Mild Pain (1 - 3)  aluminum hydroxide/magnesium hydroxide/simethicone Suspension 30 milliLiter(s) Oral every 4 hours PRN Dyspepsia  melatonin 3 milliGRAM(s) Oral at bedtime PRN Insomnia  ondansetron Injectable 4 milliGRAM(s) IV Push every 8 hours PRN Nausea and/or Vomiting      HEALTH ISSUES - PROBLEM Dx:  GCA (giant cell arteritis)    Glaucoma    Hypertension    Hyperlipidemia    Macular degeneration    Prophylactic measure    Blurred vision    Daily headache    Daily headache    Elevated sedimentation rate    < from: [facility] Duplex Carotid, Bilat (11.18.23 @ 17:47) >  IMPRESSION: No significant hemodynamic stenosis of either carotid artery.   Dedicated ultrasound of the superficial temporal arteries may be   performed to evaluate for giant cell arteritis.    Measurement of carotid stenosis is based on velocity parameters that   correlate the residual internal carotid diameter with that of the more   distal vessel in accordance with a method such as the [place]   Symptomatic Carotid Endarterectomy Trial (NASCET).      < end of copied text >

## 2023-11-20 ENCOUNTER — RESULT REVIEW (OUTPATIENT)
Age: 78
End: 2023-11-20

## 2023-11-20 LAB
ALBUMIN SERPL ELPH-MCNC: 3.5 G/DL — SIGNIFICANT CHANGE UP (ref 3.3–5)
ALBUMIN SERPL ELPH-MCNC: 3.5 G/DL — SIGNIFICANT CHANGE UP (ref 3.3–5)
ALP SERPL-CCNC: 74 U/L — SIGNIFICANT CHANGE UP (ref 40–120)
ALP SERPL-CCNC: 74 U/L — SIGNIFICANT CHANGE UP (ref 40–120)
ALT FLD-CCNC: 13 U/L — SIGNIFICANT CHANGE UP (ref 10–45)
ALT FLD-CCNC: 13 U/L — SIGNIFICANT CHANGE UP (ref 10–45)
APTT BLD: 27.4 SEC — SIGNIFICANT CHANGE UP (ref 24.5–35.6)
APTT BLD: 27.4 SEC — SIGNIFICANT CHANGE UP (ref 24.5–35.6)
AST SERPL-CCNC: 18 U/L — SIGNIFICANT CHANGE UP (ref 10–40)
AST SERPL-CCNC: 18 U/L — SIGNIFICANT CHANGE UP (ref 10–40)
BILIRUB SERPL-MCNC: 0.4 MG/DL — SIGNIFICANT CHANGE UP (ref 0.2–1.2)
BILIRUB SERPL-MCNC: 0.4 MG/DL — SIGNIFICANT CHANGE UP (ref 0.2–1.2)
BLD GP AB SCN SERPL QL: NEGATIVE — SIGNIFICANT CHANGE UP
BLD GP AB SCN SERPL QL: NEGATIVE — SIGNIFICANT CHANGE UP
BUN SERPL-MCNC: 38 MG/DL — HIGH (ref 7–23)
BUN SERPL-MCNC: 38 MG/DL — HIGH (ref 7–23)
CALCIUM SERPL-MCNC: 9.3 MG/DL — SIGNIFICANT CHANGE UP (ref 8.4–10.5)
CALCIUM SERPL-MCNC: 9.3 MG/DL — SIGNIFICANT CHANGE UP (ref 8.4–10.5)
CHLORIDE SERPL-SCNC: 107 MMOL/L — SIGNIFICANT CHANGE UP (ref 96–108)
CHLORIDE SERPL-SCNC: 107 MMOL/L — SIGNIFICANT CHANGE UP (ref 96–108)
CO2 SERPL-SCNC: 24 MMOL/L — SIGNIFICANT CHANGE UP (ref 22–31)
CO2 SERPL-SCNC: 24 MMOL/L — SIGNIFICANT CHANGE UP (ref 22–31)
CREAT SERPL-MCNC: 1.03 MG/DL — SIGNIFICANT CHANGE UP (ref 0.5–1.3)
CREAT SERPL-MCNC: 1.03 MG/DL — SIGNIFICANT CHANGE UP (ref 0.5–1.3)
EGFR: 56 ML/MIN/1.73M2 — LOW
EGFR: 56 ML/MIN/1.73M2 — LOW
GLUCOSE SERPL-MCNC: 140 MG/DL — HIGH (ref 70–99)
GLUCOSE SERPL-MCNC: 140 MG/DL — HIGH (ref 70–99)
HCT VFR BLD CALC: 30.4 % — LOW (ref 34.5–45)
HCT VFR BLD CALC: 30.4 % — LOW (ref 34.5–45)
HGB BLD-MCNC: 10 G/DL — LOW (ref 11.5–15.5)
HGB BLD-MCNC: 10 G/DL — LOW (ref 11.5–15.5)
INR BLD: 1.03 RATIO — SIGNIFICANT CHANGE UP (ref 0.85–1.18)
INR BLD: 1.03 RATIO — SIGNIFICANT CHANGE UP (ref 0.85–1.18)
MAGNESIUM SERPL-MCNC: 2.1 MG/DL — SIGNIFICANT CHANGE UP (ref 1.6–2.6)
MAGNESIUM SERPL-MCNC: 2.1 MG/DL — SIGNIFICANT CHANGE UP (ref 1.6–2.6)
MCHC RBC-ENTMCNC: 31.1 PG — SIGNIFICANT CHANGE UP (ref 27–34)
MCHC RBC-ENTMCNC: 31.1 PG — SIGNIFICANT CHANGE UP (ref 27–34)
MCHC RBC-ENTMCNC: 32.9 GM/DL — SIGNIFICANT CHANGE UP (ref 32–36)
MCHC RBC-ENTMCNC: 32.9 GM/DL — SIGNIFICANT CHANGE UP (ref 32–36)
MCV RBC AUTO: 94.4 FL — SIGNIFICANT CHANGE UP (ref 80–100)
MCV RBC AUTO: 94.4 FL — SIGNIFICANT CHANGE UP (ref 80–100)
NRBC # BLD: 0 /100 WBCS — SIGNIFICANT CHANGE UP (ref 0–0)
NRBC # BLD: 0 /100 WBCS — SIGNIFICANT CHANGE UP (ref 0–0)
PHOSPHATE SERPL-MCNC: 3 MG/DL — SIGNIFICANT CHANGE UP (ref 2.5–4.5)
PHOSPHATE SERPL-MCNC: 3 MG/DL — SIGNIFICANT CHANGE UP (ref 2.5–4.5)
PLATELET # BLD AUTO: 310 K/UL — SIGNIFICANT CHANGE UP (ref 150–400)
PLATELET # BLD AUTO: 310 K/UL — SIGNIFICANT CHANGE UP (ref 150–400)
POTASSIUM SERPL-MCNC: 4.6 MMOL/L — SIGNIFICANT CHANGE UP (ref 3.5–5.3)
POTASSIUM SERPL-MCNC: 4.6 MMOL/L — SIGNIFICANT CHANGE UP (ref 3.5–5.3)
POTASSIUM SERPL-SCNC: 4.6 MMOL/L — SIGNIFICANT CHANGE UP (ref 3.5–5.3)
POTASSIUM SERPL-SCNC: 4.6 MMOL/L — SIGNIFICANT CHANGE UP (ref 3.5–5.3)
PROT SERPL-MCNC: 6.1 G/DL — SIGNIFICANT CHANGE UP (ref 6–8.3)
PROT SERPL-MCNC: 6.1 G/DL — SIGNIFICANT CHANGE UP (ref 6–8.3)
PROTHROM AB SERPL-ACNC: 11.3 SEC — SIGNIFICANT CHANGE UP (ref 9.5–13)
PROTHROM AB SERPL-ACNC: 11.3 SEC — SIGNIFICANT CHANGE UP (ref 9.5–13)
RBC # BLD: 3.22 M/UL — LOW (ref 3.8–5.2)
RBC # BLD: 3.22 M/UL — LOW (ref 3.8–5.2)
RBC # FLD: 15.6 % — HIGH (ref 10.3–14.5)
RBC # FLD: 15.6 % — HIGH (ref 10.3–14.5)
RH IG SCN BLD-IMP: POSITIVE — SIGNIFICANT CHANGE UP
SODIUM SERPL-SCNC: 142 MMOL/L — SIGNIFICANT CHANGE UP (ref 135–145)
SODIUM SERPL-SCNC: 142 MMOL/L — SIGNIFICANT CHANGE UP (ref 135–145)
WBC # BLD: 5.63 K/UL — SIGNIFICANT CHANGE UP (ref 3.8–10.5)
WBC # BLD: 5.63 K/UL — SIGNIFICANT CHANGE UP (ref 3.8–10.5)
WBC # FLD AUTO: 5.63 K/UL — SIGNIFICANT CHANGE UP (ref 3.8–10.5)
WBC # FLD AUTO: 5.63 K/UL — SIGNIFICANT CHANGE UP (ref 3.8–10.5)

## 2023-11-20 PROCEDURE — 37609 LIGATION/BX TEMPORAL ARTERY: CPT | Mod: 50

## 2023-11-20 PROCEDURE — 99232 SBSQ HOSP IP/OBS MODERATE 35: CPT | Mod: GC

## 2023-11-20 PROCEDURE — 70543 MRI ORBT/FAC/NCK W/O &W/DYE: CPT | Mod: 26

## 2023-11-20 PROCEDURE — 99233 SBSQ HOSP IP/OBS HIGH 50: CPT

## 2023-11-20 PROCEDURE — 88305 TISSUE EXAM BY PATHOLOGIST: CPT | Mod: 26

## 2023-11-20 PROCEDURE — 88313 SPECIAL STAINS GROUP 2: CPT | Mod: 26

## 2023-11-20 DEVICE — SURGICEL 2 X 14": Type: IMPLANTABLE DEVICE | Status: FUNCTIONAL

## 2023-11-20 RX ORDER — ATORVASTATIN CALCIUM 80 MG/1
40 TABLET, FILM COATED ORAL AT BEDTIME
Refills: 0 | Status: DISCONTINUED | OUTPATIENT
Start: 2023-11-20 | End: 2023-11-22

## 2023-11-20 RX ORDER — ACETAMINOPHEN 500 MG
650 TABLET ORAL EVERY 6 HOURS
Refills: 0 | Status: DISCONTINUED | OUTPATIENT
Start: 2023-11-20 | End: 2023-11-22

## 2023-11-20 RX ORDER — ONDANSETRON 8 MG/1
4 TABLET, FILM COATED ORAL EVERY 8 HOURS
Refills: 0 | Status: DISCONTINUED | OUTPATIENT
Start: 2023-11-20 | End: 2023-11-22

## 2023-11-20 RX ORDER — LATANOPROST 0.05 MG/ML
1 SOLUTION/ DROPS OPHTHALMIC; TOPICAL AT BEDTIME
Refills: 0 | Status: DISCONTINUED | OUTPATIENT
Start: 2023-11-20 | End: 2023-11-22

## 2023-11-20 RX ORDER — LATANOPROST 0.05 MG/ML
1 SOLUTION/ DROPS OPHTHALMIC; TOPICAL
Qty: 2 | Refills: 1
Start: 2023-11-20

## 2023-11-20 RX ORDER — PANTOPRAZOLE SODIUM 20 MG/1
1 TABLET, DELAYED RELEASE ORAL
Qty: 30 | Refills: 0
Start: 2023-11-20 | End: 2023-12-19

## 2023-11-20 RX ORDER — LANOLIN ALCOHOL/MO/W.PET/CERES
3 CREAM (GRAM) TOPICAL AT BEDTIME
Refills: 0 | Status: DISCONTINUED | OUTPATIENT
Start: 2023-11-20 | End: 2023-11-22

## 2023-11-20 RX ORDER — SODIUM CHLORIDE 0.65 %
1 AEROSOL, SPRAY (ML) NASAL
Refills: 0 | Status: DISCONTINUED | OUTPATIENT
Start: 2023-11-20 | End: 2023-11-22

## 2023-11-20 RX ORDER — PANTOPRAZOLE SODIUM 20 MG/1
40 TABLET, DELAYED RELEASE ORAL
Refills: 0 | Status: DISCONTINUED | OUTPATIENT
Start: 2023-11-20 | End: 2023-11-22

## 2023-11-20 RX ORDER — LOSARTAN POTASSIUM 100 MG/1
25 TABLET, FILM COATED ORAL DAILY
Refills: 0 | Status: DISCONTINUED | OUTPATIENT
Start: 2023-11-20 | End: 2023-11-22

## 2023-11-20 RX ORDER — ENOXAPARIN SODIUM 100 MG/ML
40 INJECTION SUBCUTANEOUS EVERY 24 HOURS
Refills: 0 | Status: DISCONTINUED | OUTPATIENT
Start: 2023-11-20 | End: 2023-11-22

## 2023-11-20 RX ADMIN — LATANOPROST 1 DROP(S): 0.05 SOLUTION/ DROPS OPHTHALMIC; TOPICAL at 21:56

## 2023-11-20 RX ADMIN — ENOXAPARIN SODIUM 40 MILLIGRAM(S): 100 INJECTION SUBCUTANEOUS at 06:05

## 2023-11-20 RX ADMIN — ATORVASTATIN CALCIUM 40 MILLIGRAM(S): 80 TABLET, FILM COATED ORAL at 21:51

## 2023-11-20 RX ADMIN — PANTOPRAZOLE SODIUM 40 MILLIGRAM(S): 20 TABLET, DELAYED RELEASE ORAL at 06:04

## 2023-11-20 NOTE — PROGRESS NOTE ADULT - ASSESSMENT
Patient PATTI HARRIS is a 78y (1945)with PMH significant for cervical cancer (s/p hysterectomy), HTN, HLD, RIGHT handed woman who presented to [facility] ED on 11/16/2023, at the behest of her neurologist, Dr. Fallon, with c/o [place] and blurred vision x 1 month.    Impression: Concern for GCA    Recommendations:  -MRI brain and orbits w/wo con completed, pending report  -plan for temporal artery biopsy today  -steroid dosing as per rheumatology, appreciate recommendations. pt currently on prednisone 50mg PO daily  -continue PPI with steroids  -continue melatonin PRN for insomnia, and try to avoid steroid dosing near bedtime  - rest of care per primary team  - will continue to follow up    Plans discussed with neurology attending, Dr. Pyle

## 2023-11-20 NOTE — BRIEF OPERATIVE NOTE - NSICDXBRIEFPREOP_GEN_ALL_CORE_FT
PRE-OP DIAGNOSIS:  Daily headache 20-Nov-2023 19:23:42  Deborah Robledo  Elevated sedimentation rate 20-Nov-2023 19:24:05  Deborah Robledo

## 2023-11-20 NOTE — MEDICAL STUDENT PROGRESS NOTE(EDUCATION) - ASSESSMENT
78y F with a history of HTN and HLD presenting with a subacute onset of 1 month of global headache and several days of bilateral blurred vision, currently being worked up for giant cell arteritis.     #Suspected GCA  -Likely dx due to weeks of temporal b/l headache, reproducible tenderness on exam, slight blurred vision, age, and elevated inflammatory markers (CRP-19, ESR-75)  -Evaluated by ophthalmology- no disc edema or ophthalmic manifestations of GCA on exam    Plan:   -Recieved methylprednislone 1000 mg x1, 500 mg x2  -Now on prednisone 1mg/kg/day   -Also on pantoprazole 40mg for hx of GERD-like symptoms with prednisone   -Bactrim prophylaxis   -Declined imaging in the ED, scheduled for head MRI today  -Scheduled, consented, & cleared for temporal artery biopsy with vascular surgery today     78y F with a history of HTN and HLD presenting with a subacute onset of 1 month of global headache and several days of bilateral blurred vision, currently being worked up for giant cell arteritis.     Problem 1: Suspected GCA  -Likely dx due to weeks of temporal b/l headache, reproducible tenderness on exam, slight blurred vision, age, and elevated inflammatory markers (CRP-19, ESR-75)  -Evaluated by ophthalmology- no disc edema or ophthalmic manifestations of GCA on exam    Plan:   -Received methylprednisolone 1000 mg x1, 500 mg x2  -Now on prednisone 1mg/kg/day  -Also on pantoprazole 40mg for hx of GERD-like symptoms with prednisone   -Bactrim prophylaxis   -Declined imaging in the ED, scheduled for head MRI today  -On add-on list for temporal artery biopsy with vascular surgery today  -Likely d/c today after biopsy   -F/u outpt rheum tentatively scheduled for 11/28 at 11am at [facility]   -Plan communicated to daughter, reached at [phone]   -F/u rheum recs for discharge prednisone dosing      Problem 2: Glaucoma  ·  Plan: IOP elevated on ophtho exam  -Latanoprost qhs to both eyes.  -F/u outpt ophtho with Dr. Ignacio Ross 11/21 @ 11:30 am     Problem 3: Hypertension  ·  Plan: On losartan 25mg qd  -Will continue.     Problem 4: Hyperlipidemia  ·  Plan: On crestor 10 qd  -Will continue atorvastatin 40mg qd.     Problem 5: Macular degeneration.   ·  Plan: Pt will need outpatient follow-up.  -F/u outpt ophtho with Dr. Ignacio Ross 11/21      Problem 6: Preoperative clearance.   ·  Plan: Patient planned for temporal artery bx 11/20/23.  Pt w/ >10METS. No active chest pain. Pt w/o any cardiac history.   RCRI=0. Patint is low risk for low risk procedure.   Patient is medically optimized and no further w/u prior to planned biopsy   Hold losartan 11/20/23.     Problem 7: Prophylactic measure.   ·  Plan: DVT proph: Lovenox  Diet: Currently NPO, restart regular diet after biospy  Activity: Normal  GOC: Full code.   78y F with a history of HTN and HLD presenting with a subacute onset of 1 month of global headache and several days of bilateral blurred vision, currently being worked up for giant cell arteritis.     Problem 1: Suspected GCA  -Likely dx due to weeks of temporal b/l headache, reproducible tenderness on exam, slight blurred vision, age, and elevated inflammatory markers (CRP-19, ESR-75)  -Evaluated by ophthalmology- no disc edema or ophthalmic manifestations of GCA on exam    Plan:   -Received methylprednisolone 1000 mg x1, 500 mg x2  -Now on prednisone 1mg/kg/day  -Also on pantoprazole 40mg for hx of GERD-like symptoms with prednisone   -Bactrim prophylaxis   -Declined imaging in the ED, scheduled for head MRI today  -On add-on list for temporal artery biopsy with vascular surgery today  -Likely d/c today after biopsy   -F/u outpt rheum tentatively scheduled for 11/28 at 11am at 5 [facility]   -Plan communicated to daughter, reached at [phone]   -F/u rheum recs for discharge prednisone dosing      Problem 2: Glaucoma  ·  Plan: IOP elevated on ophtho exam  -Latanoprost qhs to both eyes.  -F/u outpt ophtho with Dr. Ignacio Ross 11/21 @ 11:30 am     Problem 3: Hypertension  ·  Plan: On losartan 25mg qd  -Will continue.     Problem 4: Hyperlipidemia  ·  Plan: On crestor 10 qd  -Will continue atorvastatin 40mg qd.     Problem 5: Macular degeneration.   ·  Plan: Pt will need outpatient follow-up.  -F/u outpt ophtho with Dr. Ignacio Ross 11/21      Problem 6: Preoperative clearance.   ·  Plan: Patient planned for temporal artery bx 11/20/23.  Pt w/ >10METS. No active chest pain. Pt w/o any cardiac history.   RCRI=0. Patint is low risk for low risk procedure.   Patient is medically optimized and no further w/u prior to planned biopsy   Hold losartan 11/20/23.    Problem 7: Insomnia   -Neurology recs appreciated  -Plan: Continue melatonin PRN for insomnia, avoid steroid dosing near bedtime     Problem 7: Prophylactic measure.   ·  Plan: DVT proph: Lovenox  Diet: Currently NPO, restart regular diet after biospy  Activity: Normal  GOC: Full code.   78y F with a history of HTN and HLD presenting with a subacute onset of 1 month of global headache and several days of bilateral blurred vision, currently being worked up for giant cell arteritis.     Problem 1: Suspected GCA  -Likely dx due to weeks of temporal b/l headache, reproducible tenderness on exam, slight blurred vision, age, and elevated inflammatory markers (CRP-19, ESR-75)  -Evaluated by ophthalmology- no disc edema or ophthalmic manifestations of GCA on exam    Plan:   -Received methylprednisolone 1000 mg x1, 500 mg x2  -Now on prednisone 1mg/kg/day  -Also on pantoprazole 40mg for hx of GERD-like symptoms with prednisone   -Bactrim prophylaxis   -Declined imaging in the ED, scheduled for head MRI today  -On add-on list for temporal artery biopsy with vascular surgery today  -Likely d/c today after biopsy   -F/u outpt rheum tentatively scheduled for 11/28 at 11am at [address] [place] [address]   -MRI brain and orbits w/wo con completed, pending report  -Plan communicated to daughter, reached at [phone]   -F/u rheum recs for discharge prednisone dosing      Problem 2: Glaucoma  ·  Plan: IOP elevated on ophtho exam  -Latanoprost qhs to both eyes.  -F/u outpt ophtho with Dr. Ignacio Ross 11/21 @ 11:30 am     Problem 3: Hypertension  ·  Plan: On losartan 25mg qd  -Will continue.     Problem 4: Hyperlipidemia  ·  Plan: On crestor 10 qd  -Will continue atorvastatin 40mg qd.     Problem 5: Macular degeneration.   ·  Plan: Pt will need outpatient follow-up.  -F/u outpt ophtho with Dr. Ignacio Ross 11/21      Problem 6: Preoperative clearance.   ·  Plan: Patient planned for temporal artery bx 11/20/23.  Pt w/ >10METS. No active chest pain. Pt w/o any cardiac history.   RCRI=0. Patint is low risk for low risk procedure.   Patient is medically optimized and no further w/u prior to planned biopsy   Hold losartan 11/20/23.    Problem 7: Insomnia   -Neurology recs appreciated  -Plan: Continue melatonin PRN for insomnia, avoid steroid dosing near bedtime     Problem 7: Prophylactic measure.   ·  Plan: DVT proph: Lovenox  Diet: Currently NPO, restart regular diet after biospy  Activity: Normal  GOC: Full code.   78y F with a history of HTN and HLD presenting with a subacute onset of 1 month of global headache and several days of bilateral blurred vision, currently being worked up for giant cell arteritis.     Problem 1: Suspected GCA  -Likely dx due to weeks of temporal b/l headache, reproducible tenderness on exam, slight blurred vision, age, and elevated inflammatory markers (CRP-19, ESR-75)  -Evaluated by ophthalmology- no disc edema or ophthalmic manifestations of GCA on exam    Plan:   -Received methylprednisolone 1000 mg x1, 500 mg x2  -Now on prednisone 1mg/kg/day  -Also on pantoprazole 40mg for hx of GERD-like symptoms with prednisone   -Bactrim prophylaxis   -Declined imaging in the ED, scheduled for head MRI today  -On add-on list for temporal artery biopsy with vascular surgery today  -Likely d/c today after biopsy   -F/u outpt rheum tentatively scheduled for 11/28 at 11am at [address] [place] [address]   -MRI brain and orbits w/wo con completed, pending report  -Plan communicated to daughter, reached at [phone]   -F/u rheum recs for discharge prednisone dosing      Problem 2: Glaucoma  ·  Plan: IOP elevated on ophtho exam  -Latanoprost qhs to both eyes.  -F/u outpt ophtho with Dr. Ignacio Ross 11/21 @ 11:30 am     Problem 3: Hypertension  ·  Plan: On losartan 25mg qd  -Will continue.     Problem 4: Hyperlipidemia  ·  Plan: On crestor 10 qd  -Will continue atorvastatin 40mg qd.     Problem 5: Macular degeneration.   ·  Plan: Pt will need outpatient follow-up.  -F/u outpt ophtho with Dr. Ignacio Ross 11/21      Problem 6: Preoperative clearance.   ·  Plan: Patient planned for temporal artery bx 11/20/23.  Pt w/ >10METS. No active chest pain. Pt w/o any cardiac history.   RCRI=0. Patint is low risk for low risk procedure.   Patient is medically optimized and no further w/u prior to planned biopsy   Hold losartan 11/20/23.    Problem 7: Insomnia   -Neurology recs appreciated  -Plan: Continue melatonin PRN for insomnia, avoid steroid dosing near bedtime     Problem 8: Prophylactic measure.   ·  Plan: DVT proph: Lovenox  Diet: Currently NPO, restart regular diet after biospy  Activity: Normal  GOC: Full code.   78y F with a history of HTN and HLD presenting with a subacute onset of 1 month of global headache and several days of bilateral blurred vision, currently being worked up for giant cell arteritis.     Problem 1: Suspected GCA  -Likely dx due to weeks of temporal b/l headache, reproducible tenderness on exam, slight blurred vision, age, and elevated inflammatory markers (CRP-19, ESR-75)  -Evaluated by ophthalmology- no disc edema or ophthalmic manifestations of GCA on exam    Plan:   -Received methylprednisolone 1000 mg x1, 500 mg x2  -Now on prednisone 1mg/kg/day  -Also on pantoprazole 40mg for hx of GERD-like symptoms with prednisone   -Bactrim prophylaxis   -Declined imaging in the ED, scheduled for head MRI today  -On add-on list for temporal artery biopsy with vascular surgery today  -Likely d/c today after biopsy   -F/u outpt rheum Dr. Any Ortiz  -MRI brain and orbits w/wo con showed unremarkable examination of orbits, prominence of temporal vessels but without significant inflammatory changes  -Plan communicated to daughter, reached at [phone]   -F/u rheum recs for discharge prednisone dosing      Problem 2: Glaucoma  ·  Plan: IOP elevated on ophtho exam  -Latanoprost qhs to both eyes.  -F/u outpt ophtho with Dr. Ignacio Ross 11/21 @ 11:30 am     Problem 3: Hypertension  ·  Plan: On losartan 25mg qd  -Will continue.     Problem 4: Hyperlipidemia  ·  Plan: On crestor 10 qd  -Will continue atorvastatin 40mg qd.     Problem 5: Macular degeneration.   ·  Plan: Pt will need outpatient follow-up.  -F/u outpt ophtho with Dr. Ignacio Ross 11/21      Problem 6: Preoperative clearance.   ·  Plan: Patient planned for temporal artery bx 11/20/23.  Pt w/ >10METS. No active chest pain. Pt w/o any cardiac history.   RCRI=0. Patint is low risk for low risk procedure.   Patient is medically optimized and no further w/u prior to planned biopsy   Hold losartan 11/20/23.    Problem 7: Insomnia   -Neurology recs appreciated  -Plan: Continue melatonin PRN for insomnia, avoid steroid dosing near bedtime     Problem 8: Prophylactic measure.   ·  Plan: DVT proph: Lovenox  Diet: Currently NPO, restart regular diet after biospy  Activity: Normal  GOC: Full code.   78y F with a history of HTN and HLD presenting with a subacute onset of 1 month of global headache and several days of bilateral blurred vision, currently being worked up for giant cell arteritis.     Problem 1: Suspected GCA  -Likely dx due to weeks of temporal b/l headache, reproducible tenderness on exam, slight blurred vision, age, and elevated inflammatory markers (CRP-19, ESR-75)  -Evaluated by ophthalmology- no disc edema or ophthalmic manifestations of GCA on exam    Plan:   -Received methylprednisolone 1000 mg x1, 500 mg x2  -Now on prednisone 1mg/kg/day  -Also on pantoprazole 40mg for hx of GERD-like symptoms with prednisone   -Bactrim prophylaxis   -Declined imaging in the ED, scheduled for head MRI today  -On add-on list for temporal artery biopsy with vascular surgery today  -Likely d/c today after biopsy   -F/u outpt rheum Dr. Any Ortiz 12/15 at 1pm  -MRI brain and orbits w/wo con showed unremarkable examination of orbits, prominence of temporal vessels but without significant inflammatory changes  -Plan communicated to daughter, reached at [phone]   -F/u rheum recs for discharge prednisone dosing      Problem 2: Glaucoma  ·  Plan: IOP elevated on ophtho exam  -Latanoprost qhs to both eyes.  -F/u outpt ophtho with Dr. Ignacio Ross 11/21 @ 11:30 am     Problem 3: Hypertension  ·  Plan: On losartan 25mg qd  -Will continue.     Problem 4: Hyperlipidemia  ·  Plan: On crestor 10 qd  -Will continue atorvastatin 40mg qd.     Problem 5: Macular degeneration.   ·  Plan: Pt will need outpatient follow-up.  -F/u outpt ophtho with Dr. Ignacio Ross 11/21      Problem 6: Preoperative clearance.   ·  Plan: Patient planned for temporal artery bx 11/20/23.  Pt w/ >10METS. No active chest pain. Pt w/o any cardiac history.   RCRI=0. Patint is low risk for low risk procedure.   Patient is medically optimized and no further w/u prior to planned biopsy   Hold losartan 11/20/23.    Problem 7: Insomnia   -Neurology recs appreciated  -Plan: Continue melatonin PRN for insomnia, avoid steroid dosing near bedtime     Problem 8: Prophylactic measure.   ·  Plan: DVT proph: Lovenox  Diet: Currently NPO, restart regular diet after biospy  Activity: Normal  GOC: Full code.

## 2023-11-20 NOTE — PROGRESS NOTE ADULT - PROBLEM SELECTOR PLAN 6
Patient planned for temporal artery bx 11/20/23.  Pt w/ >10METS. No active chest pain. Pt w/o any cardiac history.   RCRI=0. Patint is low risk for low risk procedure.   Patient is medically optimized and no further w/u prior to planned biopsy   Hold losartan 11/20/23

## 2023-11-20 NOTE — PRE-OP CHECKLIST - 2.
Patient speaks both Comoran and English but only reads Comoran. Patient refused  services for Comoran and answered all questions appropriately in English. Consents retrieved in Com[place]an because patient reads Comoran.

## 2023-11-20 NOTE — BRIEF OPERATIVE NOTE - OPERATION/FINDINGS
Bilateral temporal artery biopsies with mild calcifications noted. Good hemostasis achieved after specimen removal and ligations. Sites closed with 3 interrupted 3-0 Vicryl, running 4-0 Monocryl, and Dermabond.

## 2023-11-20 NOTE — PRE PROCEDURE NOTE - PRE PROCEDURE EVALUATION
see H&P
Diagnosis/Indication: Patient is a 78y old  Female who presents with a chief complaint of Headaches and concern of giant cell arteritis      PAST MEDICAL & SURGICAL HISTORY:       Female    Allergies: No Known Allergies      LABS:  CBC Full  -  ( 19 Nov 2023 07:11 )  WBC Count : 6.55 K/uL  RBC Count : 3.03 M/uL  Hemoglobin : 9.4 g/dL  Hematocrit : 28.9 %  Platelet Count - Automated : 319 K/uL  Mean Cell Volume : 95.4 fl  Mean Cell Hemoglobin : 31.0 pg  Mean Cell Hemoglobin Concentration : 32.5 gm/dL  Auto Neutrophil # : x  Auto Lymphocyte # : x  Auto Monocyte # : x  Auto Eosinophil # : x  Auto Basophil # : x  Auto Neutrophil % : x  Auto Lymphocyte % : x  Auto Monocyte % : x  Auto Eosinophil % : x  Auto Basophil % : x    11-19    141  |  105  |  39<H>  ----------------------------<  147<H>  4.6   |  22  |  1.10    Ca    9.1      19 Nov 2023 07:09  Phos  3.6     11-19  Mg     2.0     11-19    TPro  6.2  /  Alb  3.3  /  TBili  0.4  /  DBili  x   /  AST  17  /  ALT  11  /  AlkPhos  79  11-19        Parent present at bedside to sign consent.  - Orders:  > NPO at midnight  > Morning Labs: CBC, BMP, coags, type & screen, night and morning chlorhexidine bath  > Will consent and place in chart.  > Case scheduled for OR tomorrow.

## 2023-11-20 NOTE — PROGRESS NOTE ADULT - SUBJECTIVE AND OBJECTIVE BOX
NEUROLOGY FOLLOW-UP CONSULT NOTE    RFC: blurry vision and headache    Interval history: No acute neurologic events overnight. Patient declined Vietnamese , she is able understand/communicate in English    Meds:  MEDICATIONS  (STANDING):  atorvastatin 40 milliGRAM(s) Oral at bedtime  enoxaparin Injectable 40 milliGRAM(s) SubCutaneous every 24 hours  latanoprost 0.005% Ophthalmic Solution 1 Drop(s) Both EYES at bedtime  losartan 25 milliGRAM(s) Oral daily  pantoprazole    Tablet 40 milliGRAM(s) Oral before breakfast  predniSONE   Tablet 50 milliGRAM(s) Oral daily  sodium chloride 0.65% Nasal 1 Spray(s) Both Nostrils two times a day  trimethoprim  160 mG/sulfamethoxazole 800 mG 1 Tablet(s) Oral <User Schedule>    MEDICATIONS  (PRN):  acetaminophen     Tablet .. 650 milliGRAM(s) Oral every 6 hours PRN Temp greater or equal to 38C (100.4F), Mild Pain (1 - 3)  aluminum hydroxide/magnesium hydroxide/simethicone Suspension 30 milliLiter(s) Oral every 4 hours PRN Dyspepsia  melatonin 3 milliGRAM(s) Oral at bedtime PRN Insomnia  ondansetron Injectable 4 milliGRAM(s) IV Push every 8 hours PRN Nausea and/or Vomiting      PMHx/PSHx/FHx/SHx:    GCA (giant cell arteritis)    Glaucoma    Hypertension    Hyperlipidemia    Macular degeneration    Blurred vision    Elevated sedimentation rate    Daily headache    Elevated sedimentation rate        Allergies:  No Known Allergies      ROS: All systems negative except as documented in Interval history    O:  T(C): 36.7 (11-20-23 @ 04:41), Max: 36.7 (11-19-23 @ 17:05)  HR: 73 (11-20-23 @ 04:41) (69 - 73)  BP: 141/73 (11-20-23 @ 04:41) (123/73 - 150/71)  RR: 18 (11-20-23 @ 04:41) (18 - 18)  SpO2: 99% (11-20-23 @ 04:41) (98% - 99%)    Focused neurologic exam:  MS - AAO x3, speech fluent, rep/naming intact, follows commands, attn/conc/recent and remote memory/fund of knowledge WNL  CN - PERRLA, EOMI, VFF, face sens/str/hearing WNL b/l, tongue/palate midline, trap 5/5 b/l  Motor - Normal bulk/tone, 5/5 all  Sens - LT/temp/vib intact all  DTR's - 3+ b/l UE, 2+ b/l LE and downgoing b/l plantar response  Coord - FtN intact b/l  Gait and station - Normal casual gait.     Pertinent labs/studies:    LABS:  cret                        10.0   5.63  )-----------( 310      ( 20 Nov 2023 04:15 )             30.4     11-20    142  |  107  |  38<H>  ----------------------------<  140<H>  4.6   |  24  |  1.03    Ca    9.3      20 Nov 2023 04:15  Phos  3.0     11-20  Mg     2.1     11-20    TPro  6.1  /  Alb  3.5  /  TBili  0.4  /  DBili  x   /  AST  18  /  ALT  13  /  AlkPhos  74  11-20    PT/INR - ( 20 Nov 2023 04:15 )   PT: 11.3 sec;   INR: 1.03 ratio         PTT - ( 20 Nov 2023 04:15 )  PTT:27.4 sec    < from: [facility] Duplex Carotid, Bilat (11.18.23 @ 17:47) >  IMPRESSION: No significant hemodynamic stenosis of either carotid artery.     < end of copied text >

## 2023-11-20 NOTE — PROGRESS NOTE ADULT - ATTENDING COMMENTS
78y F pmh htn, hld here for c/o persistent b/l temporal HA for weeks found to have elevated ESR/CRP concerning for GCA  pulse dose steroids completed x 3 days  on prednisone 50mg  appreciate rheum, optho, vasc, neuro recs  mri orbits slight prominence of the vessels  for temporal artery biopsy today then dc home  dc time 555 min 78y F pmh htn, hld here for c/o persistent b/l temporal HA for weeks found to have elevated ESR/CRP concerning for GCA  pulse dose steroids completed x 3 days  on prednisone 50mg  appreciate rheum, optho, vasc, neuro recs  mri orbits slight prominence of the vessels  for temporal artery biopsy today then dc home  dc time 55 min

## 2023-11-20 NOTE — PROGRESS NOTE ADULT - PROBLEM SELECTOR PLAN 1
Weeks of temporal bilateral headache, slight blurred vision recently  CRP of 19, ESR of 75  Evaluated by ophthalmology and found not to have any disc edema or ophthalmologic manifestations of GCA on exam  Declined CT, MRI in ED. Daughter reports she had these done recently and they were unremarkable  Rheum consulted, appreciate recs  Plan:  -Methylprednisolone 1000mg x1 then 500mg x2 qd then prednisone 1mg/kg/day   -Pantoprazole 40mg daily for ppx on steroids  -Bactrim PCP prophylaxis  -vascular surgery consulted for temporal artery biopsy, patient reportedly agreable to bx per vascular 11/19 No indicators present

## 2023-11-20 NOTE — MEDICAL STUDENT PROGRESS NOTE(EDUCATION) - SUBJECTIVE AND OBJECTIVE BOX
Medical Student Note    Patient Information:  PATTI HARRIS / 78y / Female / MRN#:[id number]    Hospital Day: 4d    Interval History:  Patient seen and examined at bedside. No acute events overnight. Endorses no changes in headache.    HPI:  77 y/o female with PMH of HTN, HLD presenting with "weeks" of bilateral temporal headache. She notes the headache is constant but waxes and wanes in intensity. The headache sometimes radiates to the back of her head. She denies any recent nausea, vomiting, photophobia, phonophobia, changes in sensation, weakness, fevers, chills, shortness of breath, or chest pain.     Of note, patient was evaluated in the outpatient setting for her headache. Due to concern for GCA, ESR and CRP were sent and found to be elevated (ESR:90, CRP: 16). Due to difficulties scheduling outpatient biopsy and patient noting vision which was slightly more blurry than usual, she was sent to ED. She has also lost 5-10 lbs over the past year.    In ED, patient was vitally stable with labs significant for CRP of 19 and ESR of 75. She was given 1g of methylprednisolone in the ED.    ROS:  Gen: No fever, normal appetite  Eyes: No eye irritation or discharge  ENT: No ear pain, congestion, sore throat  Resp: No cough or trouble breathing  Cardiovascular: No chest pain or palpitation  Gastroenteric: No nausea/vomiting, diarrhea, constipation  :  No change in urine output; no dysuria  MS: No joint or muscle pain  Skin: No rashes  Neuro: No headache; no abnormal movements  Remainder negative, except as per the HPI      Past Medical History:  Cervical Cancer  Hiatal Hernia   GERD    Past Surgical History:  Hysterectomy     Allergies:  No Known Allergies    Medications:  PRN:  acetaminophen     Tablet .. 650 milliGRAM(s) Oral every 6 hours PRN Temp greater or equal to 38C (100.4F), Mild Pain (1 - 3)  aluminum hydroxide/magnesium hydroxide/simethicone Suspension 30 milliLiter(s) Oral every 4 hours PRN Dyspepsia  melatonin 3 milliGRAM(s) Oral at bedtime PRN Insomnia  ondansetron Injectable 4 milliGRAM(s) IV Push every 8 hours PRN Nausea and/or Vomiting    Standing:  atorvastatin 40 milliGRAM(s) Oral at bedtime  enoxaparin Injectable 40 milliGRAM(s) SubCutaneous every 24 hours  latanoprost 0.005% Ophthalmic Solution 1 Drop(s) Both EYES at bedtime  losartan 25 milliGRAM(s) Oral daily  pantoprazole    Tablet 40 milliGRAM(s) Oral before breakfast  predniSONE   Tablet 50 milliGRAM(s) Oral daily  sodium chloride 0.65% Nasal 1 Spray(s) Both Nostrils two times a day  trimethoprim  160 mG/sulfamethoxazole 800 mG 1 Tablet(s) Oral <User Schedule>    Home:  Crestor 10 mg oral tablet: 1 tab(s) orally once a day  losartan 25 mg oral tablet: 1 tab(s) orally once a day  pantoprazole 40 mg oral delayed release tablet: 1 tab(s) orally    Vitals:  T(C): 36.7, Max: 36.7 (11-19-23 @ 17:05)  T(F): 98.1, Max: 98.1 (11-20-23 @ 04:41)  HR: 73 (69 - 76)  BP: 141/73 (122/69 - 150/71)  RR: 18 (18 - 18)  SpO2: 99% (98% - 99%)    Physical Exam:  General: Awake, Alert. Not in acute distress.  Head: Normocephalic atraumatic.  Neck: Supple  Heart: Regular rate and rhythm; S1, S2; No murmurs.  Lungs: Clear to auscultation bilaterally.  Abdomen: Soft, nontender, nondistended.  Extremities: No edema in upper or lower extremities.  Neuro: AAOx3, No focal deficits.    Labs:  CBC (11-20 @ 04:15)                        Hgb: 10.0   WBC: 5.63  )-----------------( Plts: 310                              Hct: 30.4     Chem (11-20 @ 04:15)  Na: 142  |     Cl: 107     |  BUN: 38  -----------------------------------------< Gluc: 140    K: 4.6   |    HCO3: 24  |  Cr: 1.03    Ca 9.3 (11-20 @ 04:15)  Phos 3.0 (11-20 @ 04:15)  Mg 2.1 (11-20 @ 04:15)    LFTs (11-20 @ 04:15)  TPro 6.1  /  Alb 3.5  TBili 0.4  /  DBili     AST 18  /  ALT 13  /  AlkPhos 74        PT/INR (11-20 @ 04:15)  PT: 11.3 ; INR: 1.03   PTT: 27.4      Microbiology:    Radiology:  < from: [facility] Duplex Carotid, Bilat (11.18.23 @ 17:47) >  IMPRESSION: No significant hemodynamic stenosis of either carotid artery.   Dedicated ultrasound of the superficial temporal arteries may be   performed to evaluate for giant cell arteritis.    < end of copied text >   Medical Student Note    Patient Information:  PATTI HARRIS / 78y / Female / MRN#:[id number]    Hospital Day: 4d    Interval History:  Patient seen and examined at bedside. No acute events overnight. Endorses no changes in headache.    HPI:  77 y/o female with PMH of HTN, HLD presenting with "weeks" of bilateral temporal headache. She notes the headache is constant but waxes and wanes in intensity. The headache sometimes radiates to the back of her head. She denies any recent nausea, vomiting, photophobia, phonophobia, changes in sensation, weakness, fevers, chills, shortness of breath, or chest pain.     Of note, patient was evaluated in the outpatient setting for her headache. Due to concern for GCA, ESR and CRP were sent and found to be elevated (ESR:90, CRP: 16). Due to difficulties scheduling outpatient biopsy and patient noting vision which was slightly more blurry than usual, she was sent to ED. She has also lost 5-10 lbs over the past year.    In ED, patient was vitally stable with labs significant for CRP of 19 and ESR of 75. She was given 1g of methylprednisolone in the ED.    ROS:  Gen: No fever, normal appetite  Eyes: No eye irritation or discharge  ENT: No ear pain, congestion, sore throat  Resp: No cough or trouble breathing  Cardiovascular: No chest pain or palpitation  Gastroenteric: No nausea/vomiting, diarrhea, constipation  :  No change in urine output; no dysuria  MS: No joint or muscle pain  Skin: No rashes  Neuro: No headache; no abnormal movements  Remainder negative, except as per the HPI      Past Medical History:  Cervical Cancer  Hiatal Hernia   GERD    Past Surgical History:  Hysterectomy     Allergies:  No Known Allergies    Medications:  PRN:  acetaminophen     Tablet .. 650 milliGRAM(s) Oral every 6 hours PRN Temp greater or equal to 38C (100.4F), Mild Pain (1 - 3)  aluminum hydroxide/magnesium hydroxide/simethicone Suspension 30 milliLiter(s) Oral every 4 hours PRN Dyspepsia  melatonin 3 milliGRAM(s) Oral at bedtime PRN Insomnia  ondansetron Injectable 4 milliGRAM(s) IV Push every 8 hours PRN Nausea and/or Vomiting    Standing:  atorvastatin 40 milliGRAM(s) Oral at bedtime  enoxaparin Injectable 40 milliGRAM(s) SubCutaneous every 24 hours  latanoprost 0.005% Ophthalmic Solution 1 Drop(s) Both EYES at bedtime  losartan 25 milliGRAM(s) Oral daily  pantoprazole    Tablet 40 milliGRAM(s) Oral before breakfast  predniSONE   Tablet 50 milliGRAM(s) Oral daily  sodium chloride 0.65% Nasal 1 Spray(s) Both Nostrils two times a day  trimethoprim  160 mG/sulfamethoxazole 800 mG 1 Tablet(s) Oral <User Schedule>    Home:  Crestor 10 mg oral tablet: 1 tab(s) orally once a day  losartan 25 mg oral tablet: 1 tab(s) orally once a day  pantoprazole 40 mg oral delayed release tablet: 1 tab(s) orally    Vitals:  T(C): 36.7, Max: 36.7 (11-19-23 @ 17:05)  T(F): 98.1, Max: 98.1 (11-20-23 @ 04:41)  HR: 73 (69 - 76)  BP: 141/73 (122/69 - 150/71)  RR: 18 (18 - 18)  SpO2: 99% (98% - 99%)    Physical Exam:  General: Awake, Alert. Not in acute distress.  Head: Normocephalic atraumatic.  Neck: Supple  Heart: Regular rate and rhythm; S1, S2; No murmurs.  Lungs: Clear to auscultation bilaterally.  Abdomen: Soft, nontender, nondistended.  Extremities: No edema in upper or lower extremities.  Neuro: AAOx3, No focal deficits.    Labs:  CBC (11-20 @ 04:15)                        Hgb: 10.0   WBC: 5.63  )-----------------( Plts: 310                              Hct: 30.4     Chem (11-20 @ 04:15)  Na: 142  |     Cl: 107     |  BUN: 38  -----------------------------------------< Gluc: 140    K: 4.6   |    HCO3: 24  |  Cr: 1.03    Ca 9.3 (11-20 @ 04:15)  Phos 3.0 (11-20 @ 04:15)  Mg 2.1 (11-20 @ 04:15)    LFTs (11-20 @ 04:15)  TPro 6.1  /  Alb 3.5  TBili 0.4  /  DBili     AST 18  /  ALT 13  /  AlkPhos 74        PT/INR (11-20 @ 04:15)  PT: 11.3 ; INR: 1.03   PTT: 27.4      Microbiology:    Radiology:  < from: MR Orbits w/wo IV Cont (11.20.23 @ 11:15) >  1.  Unremarkable examination of the orbits.  2.  Slight prominence to the vessels overlying the left temple without   evidence of significant inflammatory change or juliana intraluminal filling   defect.    < end of copied text >    < from: [facility] Duplex Carotid, Bilat (11.18.23 @ 17:47) >  IMPRESSION: No significant hemodynamic stenosis of either carotid artery.   Dedicated ultrasound of the superficial temporal arteries may be   performed to evaluate for giant cell arteritis.    < end of copied text >

## 2023-11-20 NOTE — PROGRESS NOTE ADULT - SUBJECTIVE AND OBJECTIVE BOX
PROGRESS NOTE:   Authored by Oneyda Paris MD PGY-1  Internal Medicine      Patient is a 78y old  Female who presents with a chief complaint of Headaches (19 Nov 2023 10:16)      SUBJECTIVE / OVERNIGHT EVENTS: ***, patient seen and examined at bedside    MEDICATIONS  (STANDING):  atorvastatin 40 milliGRAM(s) Oral at bedtime  enoxaparin Injectable 40 milliGRAM(s) SubCutaneous every 24 hours  latanoprost 0.005% Ophthalmic Solution 1 Drop(s) Both EYES at bedtime  losartan 25 milliGRAM(s) Oral daily  pantoprazole    Tablet 40 milliGRAM(s) Oral before breakfast  predniSONE   Tablet 50 milliGRAM(s) Oral daily  sodium chloride 0.65% Nasal 1 Spray(s) Both Nostrils two times a day  trimethoprim  160 mG/sulfamethoxazole 800 mG 1 Tablet(s) Oral <User Schedule>    MEDICATIONS  (PRN):  acetaminophen     Tablet .. 650 milliGRAM(s) Oral every 6 hours PRN Temp greater or equal to 38C (100.4F), Mild Pain (1 - 3)  aluminum hydroxide/magnesium hydroxide/simethicone Suspension 30 milliLiter(s) Oral every 4 hours PRN Dyspepsia  melatonin 3 milliGRAM(s) Oral at bedtime PRN Insomnia  ondansetron Injectable 4 milliGRAM(s) IV Push every 8 hours PRN Nausea and/or Vomiting      CAPILLARY BLOOD GLUCOSE        I&O's Summary    19 Nov 2023 07:01  -  20 Nov 2023 07:00  --------------------------------------------------------  IN: 480 mL / OUT: 0 mL / NET: 480 mL        PHYSICAL EXAM:  Vital Signs Last 24 Hrs  T(C): 36.7 (20 Nov 2023 04:41), Max: 36.7 (19 Nov 2023 17:05)  T(F): 98.1 (20 Nov 2023 04:41), Max: 98.1 (20 Nov 2023 04:41)  HR: 73 (20 Nov 2023 04:41) (69 - 76)  BP: 141/73 (20 Nov 2023 04:41) (122/69 - 150/71)  BP(mean): --  RR: 18 (20 Nov 2023 04:41) (18 - 18)  SpO2: 99% (20 Nov 2023 04:41) (98% - 99%)    Parameters below as of 20 Nov 2023 04:41  Patient On (Oxygen Delivery Method): room air      CONSTITUTIONAL: Well-groomed, in no apparent distress  RESPIRATORY: Breathing comfortably; no dullness to percussion; lungs CTA without wheeze/rhonchi/rales  CARDIOVASCULAR: +S1S2, RRR, no M/G/R; pedal pulses full and symmetric; no lower extremity edema  GASTROINTESTINAL: No palpable masses or tenderness, +BS throughout, no rebound/guarding; no hepatosplenomegaly; no hernia palpated  SKIN: No rashes or ulcers noted  NEUROLOGIC: A+O x 3, CN II-XII intact; sensation intact in LEs b/l to light touch    LABS:                        10.0   5.63  )-----------( 310      ( 20 Nov 2023 04:15 )             30.4     11-20    142  |  107  |  38<H>  ----------------------------<  140<H>  4.6   |  24  |  1.03    Ca    9.3      20 Nov 2023 04:15  Phos  3.0     11-20  Mg     2.1     11-20    TPro  6.1  /  Alb  3.5  /  TBili  0.4  /  DBili  x   /  AST  18  /  ALT  13  /  AlkPhos  74  11-20    PT/INR - ( 20 Nov 2023 04:15 )   PT: 11.3 sec;   INR: 1.03 ratio         PTT - ( 20 Nov 2023 04:15 )  PTT:27.4 sec      Urinalysis Basic - ( 20 Nov 2023 04:15 )    Color: x / Appearance: x / SG: x / pH: x  Gluc: 140 mg/dL / Ketone: x  / Bili: x / Urobili: x   Blood: x / Protein: x / Nitrite: x   Leuk Esterase: x / RBC: x / WBC x   Sq Epi: x / Non Sq Epi: x / Bacteria: x          RADIOLOGY & ADDITIONAL TESTS:  Results Reviewed:   Imaging Personally Reviewed:  Electrocardiogram Personally Reviewed:

## 2023-11-21 DIAGNOSIS — R42 DIZZINESS AND GIDDINESS: ICD-10-CM

## 2023-11-21 LAB
GAMMA INTERFERON BACKGROUND BLD IA-ACNC: 0.01 IU/ML — SIGNIFICANT CHANGE UP
GAMMA INTERFERON BACKGROUND BLD IA-ACNC: 0.01 IU/ML — SIGNIFICANT CHANGE UP
M TB IFN-G BLD-IMP: ABNORMAL
M TB IFN-G BLD-IMP: ABNORMAL
M TB IFN-G CD4+ BCKGRND COR BLD-ACNC: 0 IU/ML — SIGNIFICANT CHANGE UP
M TB IFN-G CD4+ BCKGRND COR BLD-ACNC: 0 IU/ML — SIGNIFICANT CHANGE UP
M TB IFN-G CD4+CD8+ BCKGRND COR BLD-ACNC: 0 IU/ML — SIGNIFICANT CHANGE UP
M TB IFN-G CD4+CD8+ BCKGRND COR BLD-ACNC: 0 IU/ML — SIGNIFICANT CHANGE UP
QUANT TB PLUS MITOGEN MINUS NIL: 0.11 IU/ML — SIGNIFICANT CHANGE UP
QUANT TB PLUS MITOGEN MINUS NIL: 0.11 IU/ML — SIGNIFICANT CHANGE UP

## 2023-11-21 PROCEDURE — 99232 SBSQ HOSP IP/OBS MODERATE 35: CPT

## 2023-11-21 PROCEDURE — 70498 CT ANGIOGRAPHY NECK: CPT | Mod: 26

## 2023-11-21 PROCEDURE — 70496 CT ANGIOGRAPHY HEAD: CPT | Mod: 26

## 2023-11-21 PROCEDURE — 99232 SBSQ HOSP IP/OBS MODERATE 35: CPT | Mod: GC

## 2023-11-21 RX ORDER — HYDRALAZINE HCL 50 MG
25 TABLET ORAL ONCE
Refills: 0 | Status: DISCONTINUED | OUTPATIENT
Start: 2023-11-21 | End: 2023-11-21

## 2023-11-21 RX ORDER — IBUPROFEN 200 MG
400 TABLET ORAL EVERY 6 HOURS
Refills: 0 | Status: DISCONTINUED | OUTPATIENT
Start: 2023-11-21 | End: 2023-11-22

## 2023-11-21 RX ORDER — KETOROLAC TROMETHAMINE 30 MG/ML
15 SYRINGE (ML) INJECTION ONCE
Refills: 0 | Status: DISCONTINUED | OUTPATIENT
Start: 2023-11-21 | End: 2023-11-21

## 2023-11-21 RX ADMIN — Medication 15 MILLIGRAM(S): at 20:32

## 2023-11-21 RX ADMIN — LATANOPROST 1 DROP(S): 0.05 SOLUTION/ DROPS OPHTHALMIC; TOPICAL at 21:13

## 2023-11-21 RX ADMIN — ENOXAPARIN SODIUM 40 MILLIGRAM(S): 100 INJECTION SUBCUTANEOUS at 05:11

## 2023-11-21 RX ADMIN — Medication 1 SPRAY(S): at 18:09

## 2023-11-21 RX ADMIN — LOSARTAN POTASSIUM 25 MILLIGRAM(S): 100 TABLET, FILM COATED ORAL at 09:05

## 2023-11-21 RX ADMIN — ATORVASTATIN CALCIUM 40 MILLIGRAM(S): 80 TABLET, FILM COATED ORAL at 21:14

## 2023-11-21 RX ADMIN — Medication 1 SPRAY(S): at 05:11

## 2023-11-21 RX ADMIN — Medication 15 MILLIGRAM(S): at 11:11

## 2023-11-21 RX ADMIN — PANTOPRAZOLE SODIUM 40 MILLIGRAM(S): 20 TABLET, DELAYED RELEASE ORAL at 05:11

## 2023-11-21 RX ADMIN — Medication 50 MILLIGRAM(S): at 09:05

## 2023-11-21 NOTE — PROGRESS NOTE ADULT - SUBJECTIVE AND OBJECTIVE BOX
PROGRESS NOTE:   Authored by Oneyda Paris MD PGY-1  Internal Medicine      Patient is a 78y old  Female who presents with a chief complaint of Headaches (20 Nov 2023 12:52)      SUBJECTIVE / OVERNIGHT EVENTS: ***, patient seen and examined at bedside    MEDICATIONS  (STANDING):  atorvastatin 40 milliGRAM(s) Oral at bedtime  enoxaparin Injectable 40 milliGRAM(s) SubCutaneous every 24 hours  latanoprost 0.005% Ophthalmic Solution 1 Drop(s) Both EYES at bedtime  losartan 25 milliGRAM(s) Oral daily  pantoprazole    Tablet 40 milliGRAM(s) Oral before breakfast  predniSONE   Tablet 50 milliGRAM(s) Oral daily  sodium chloride 0.65% Nasal 1 Spray(s) Both Nostrils two times a day  trimethoprim  160 mG/sulfamethoxazole 800 mG 1 Tablet(s) Oral <User Schedule>    MEDICATIONS  (PRN):  acetaminophen     Tablet .. 650 milliGRAM(s) Oral every 6 hours PRN Temp greater or equal to 38C (100.4F), Mild Pain (1 - 3)  aluminum hydroxide/magnesium hydroxide/simethicone Suspension 30 milliLiter(s) Oral every 4 hours PRN Dyspepsia  melatonin 3 milliGRAM(s) Oral at bedtime PRN Insomnia  ondansetron Injectable 4 milliGRAM(s) IV Push every 8 hours PRN Nausea and/or Vomiting      CAPILLARY BLOOD GLUCOSE        I&O's Summary      PHYSICAL EXAM:  Vital Signs Last 24 Hrs  T(C): 36.5 (21 Nov 2023 04:39), Max: 37 (20 Nov 2023 16:04)  T(F): 97.7 (21 Nov 2023 04:39), Max: 98.6 (20 Nov 2023 16:04)  HR: 55 (21 Nov 2023 04:39) (55 - 72)  BP: 133/65 (21 Nov 2023 04:39) (124/67 - 158/70)  BP(mean): 88 (20 Nov 2023 20:15) (84 - 90)  RR: 18 (21 Nov 2023 04:39) (16 - 18)  SpO2: 96% (21 Nov 2023 04:39) (96% - 100%)    Parameters below as of 21 Nov 2023 04:39  Patient On (Oxygen Delivery Method): room air      CONSTITUTIONAL: Well-groomed, in no apparent distress  RESPIRATORY: Breathing comfortably; no dullness to percussion; lungs CTA without wheeze/rhonchi/rales  CARDIOVASCULAR: +S1S2, RRR, no M/G/R; pedal pulses full and symmetric; no lower extremity edema  GASTROINTESTINAL: No palpable masses or tenderness, +BS throughout, no rebound/guarding; no hepatosplenomegaly; no hernia palpated  SKIN: No rashes or ulcers noted  NEUROLOGIC: A+O x 3, CN II-XII intact; sensation intact in LEs b/l to light touch    LABS:                        10.0   5.63  )-----------( 310      ( 20 Nov 2023 04:15 )             30.4     11-20    142  |  107  |  38<H>  ----------------------------<  140<H>  4.6   |  24  |  1.03    Ca    9.3      20 Nov 2023 04:15  Phos  3.0     11-20  Mg     2.1     11-20    TPro  6.1  /  Alb  3.5  /  TBili  0.4  /  DBili  x   /  AST  18  /  ALT  13  /  AlkPhos  74  11-20    PT/INR - ( 20 Nov 2023 04:15 )   PT: 11.3 sec;   INR: 1.03 ratio         PTT - ( 20 Nov 2023 04:15 )  PTT:27.4 sec      Urinalysis Basic - ( 20 Nov 2023 04:15 )    Color: x / Appearance: x / SG: x / pH: x  Gluc: 140 mg/dL / Ketone: x  / Bili: x / Urobili: x   Blood: x / Protein: x / Nitrite: x   Leuk Esterase: x / RBC: x / WBC x   Sq Epi: x / Non Sq Epi: x / Bacteria: x          RADIOLOGY & ADDITIONAL TESTS:  Results Reviewed:   Imaging Personally Reviewed:  Electrocardiogram Personally Reviewed:   PROGRESS NOTE:   Authored by Oneyda Paris MD PGY-1  Internal Medicine      Patient is a 78y old  Female who presents with a chief complaint of Headaches (20 Nov 2023 12:52)      SUBJECTIVE / OVERNIGHT EVENTS: NAEO. Pt with complaints of dizziness and unsteadiness. Also endorsing posterior headache and temporal pain at site of biopsy. Seen and examined at bedside.    MEDICATIONS  (STANDING):  atorvastatin 40 milliGRAM(s) Oral at bedtime  enoxaparin Injectable 40 milliGRAM(s) SubCutaneous every 24 hours  latanoprost 0.005% Ophthalmic Solution 1 Drop(s) Both EYES at bedtime  losartan 25 milliGRAM(s) Oral daily  pantoprazole    Tablet 40 milliGRAM(s) Oral before breakfast  predniSONE   Tablet 50 milliGRAM(s) Oral daily  sodium chloride 0.65% Nasal 1 Spray(s) Both Nostrils two times a day  trimethoprim  160 mG/sulfamethoxazole 800 mG 1 Tablet(s) Oral <User Schedule>    MEDICATIONS  (PRN):  acetaminophen     Tablet .. 650 milliGRAM(s) Oral every 6 hours PRN Temp greater or equal to 38C (100.4F), Mild Pain (1 - 3)  aluminum hydroxide/magnesium hydroxide/simethicone Suspension 30 milliLiter(s) Oral every 4 hours PRN Dyspepsia  melatonin 3 milliGRAM(s) Oral at bedtime PRN Insomnia  ondansetron Injectable 4 milliGRAM(s) IV Push every 8 hours PRN Nausea and/or Vomiting      CAPILLARY BLOOD GLUCOSE        I&O's Summary      PHYSICAL EXAM:  Vital Signs Last 24 Hrs  T(C): 36.5 (21 Nov 2023 04:39), Max: 37 (20 Nov 2023 16:04)  T(F): 97.7 (21 Nov 2023 04:39), Max: 98.6 (20 Nov 2023 16:04)  HR: 55 (21 Nov 2023 04:39) (55 - 72)  BP: 133/65 (21 Nov 2023 04:39) (124/67 - 158/70)  BP(mean): 88 (20 Nov 2023 20:15) (84 - 90)  RR: 18 (21 Nov 2023 04:39) (16 - 18)  SpO2: 96% (21 Nov 2023 04:39) (96% - 100%)  Orthostatic vitals negative       Parameters below as of 21 Nov 2023 04:39  Patient On (Oxygen Delivery Method): room air      CONSTITUTIONAL: Well-groomed, in no apparent distress  RESPIRATORY: Breathing comfortably; no dullness to percussion; lungs CTA without wheeze/rhonchi/rales  CARDIOVASCULAR: +S1S2, RRR, no M/G/R; pedal pulses full and symmetric; no lower extremity edema  GASTROINTESTINAL: No palpable masses or tenderness, +BS throughout, no rebound/guarding; no hepatosplenomegaly; no hernia palpated  SKIN: No rashes or ulcers noted  NEUROLOGIC: A+O x 3, CN II-XII intact; sensation intact in LEs b/l to light touch    LABS:                        10.0   5.63  )-----------( 310      ( 20 Nov 2023 04:15 )             30.4     11-20    142  |  107  |  38<H>  ----------------------------<  140<H>  4.6   |  24  |  1.03    Ca    9.3      20 Nov 2023 04:15  Phos  3.0     11-20  Mg     2.1     11-20    TPro  6.1  /  Alb  3.5  /  TBili  0.4  /  DBili  x   /  AST  18  /  ALT  13  /  AlkPhos  74  11-20    PT/INR - ( 20 Nov 2023 04:15 )   PT: 11.3 sec;   INR: 1.03 ratio         PTT - ( 20 Nov 2023 04:15 )  PTT:27.4 sec

## 2023-11-21 NOTE — PROGRESS NOTE ADULT - SUBJECTIVE AND OBJECTIVE BOX
Vascular Surgery Progress Note  Patient is a 78y old  Female who presents with a chief complaint of Headaches (21 Nov 2023 08:50)      INTERVAL EVENTS: No acute events overnight.  SUBJECTIVE: Patient seen and examined at bedside with surgical team, patient without complaints. Denies fever, chills, CP, SOB nausea, vomiting, abdominal pain.      OBJECTIVE:    Vital Signs Last 24 Hrs  T(C): 36.8 (21 Nov 2023 09:05), Max: 37 (20 Nov 2023 16:04)  T(F): 98.2 (21 Nov 2023 09:05), Max: 98.6 (20 Nov 2023 16:04)  HR: 81 (21 Nov 2023 09:05) (55 - 81)  BP: 126/68 (21 Nov 2023 09:05) (124/67 - 158/70)  BP(mean): 88 (20 Nov 2023 20:15) (84 - 90)  RR: 18 (21 Nov 2023 09:05) (16 - 18)  SpO2: 98% (21 Nov 2023 09:05) (96% - 100%)    Parameters below as of 21 Nov 2023 09:05  Patient On (Oxygen Delivery Method): room air    I&O's Detail  MEDICATIONS  (STANDING):  atorvastatin 40 milliGRAM(s) Oral at bedtime  enoxaparin Injectable 40 milliGRAM(s) SubCutaneous every 24 hours  hydrALAZINE 25 milliGRAM(s) Oral once  latanoprost 0.005% Ophthalmic Solution 1 Drop(s) Both EYES at bedtime  losartan 25 milliGRAM(s) Oral daily  pantoprazole    Tablet 40 milliGRAM(s) Oral before breakfast  predniSONE   Tablet 50 milliGRAM(s) Oral daily  sodium chloride 0.65% Nasal 1 Spray(s) Both Nostrils two times a day  trimethoprim  160 mG/sulfamethoxazole 800 mG 1 Tablet(s) Oral <User Schedule>    MEDICATIONS  (PRN):  acetaminophen     Tablet .. 650 milliGRAM(s) Oral every 6 hours PRN Temp greater or equal to 38C (100.4F), Mild Pain (1 - 3)  aluminum hydroxide/magnesium hydroxide/simethicone Suspension 30 milliLiter(s) Oral every 4 hours PRN Dyspepsia  melatonin 3 milliGRAM(s) Oral at bedtime PRN Insomnia  ondansetron Injectable 4 milliGRAM(s) IV Push every 8 hours PRN Nausea and/or Vomiting      PHYSICAL EXAM:  Constitutional: A&Ox3, NAD  HEENT: L TAB site c/d/i. CATRACHITO, CN2-10 intact  Respiratory: Unlabored breathing  Abdomen: Soft, nondistended, NTTP. No rebound or guarding.  Extremities: WWP, PALACIO spontaneously    LABS:                        10.0   5.63  )-----------( 310      ( 20 Nov 2023 04:15 )             30.4     11-20    142  |  107  |  38<H>  ----------------------------<  140<H>  4.6   |  24  |  1.03    Ca    9.3      20 Nov 2023 04:15  Phos  3.0     11-20  Mg     2.1     11-20    TPro  6.1  /  Alb  3.5  /  TBili  0.4  /  DBili  x   /  AST  18  /  ALT  13  /  AlkPhos  74  11-20    PT/INR - ( 20 Nov 2023 04:15 )   PT: 11.3 sec;   INR: 1.03 ratio         PTT - ( 20 Nov 2023 04:15 )  PTT:27.4 sec  LIVER FUNCTIONS - ( 20 Nov 2023 04:15 )  Alb: 3.5 g/dL / Pro: 6.1 g/dL / ALK PHOS: 74 U/L / ALT: 13 U/L / AST: 18 U/L / GGT: x           Urinalysis Basic - ( 20 Nov 2023 04:15 )    Color: x / Appearance: x / SG: x / pH: x  Gluc: 140 mg/dL / Ketone: x  / Bili: x / Urobili: x   Blood: x / Protein: x / Nitrite: x   Leuk Esterase: x / RBC: x / WBC x   Sq Epi: x / Non Sq Epi: x / Bacteria: x          IMAGING:

## 2023-11-21 NOTE — PROGRESS NOTE ADULT - PROBLEM SELECTOR PLAN 5
"Chief Complaint  Follow-up and Hypertension    Subjective          Traci King presents to [facility] for follow up on blood pressure. Pt was seen 2 weeks ago for hospital follow up and diagnosed with new HTN and started on ACEi. No issues tolerating medication, taking daily as prescribed. Has not been checking her BP at home routinely since starting medication. No cough.   Still working on smoking cessation, holding at 3 cigs/day.   Did have body aches and low grade fever this weekend, was worried about the Flu but did not get tested. Does feel better now.   She has had epidural inj since she was last seen and is pending a nerve ablation test later this month.     Objective   Vital Signs:     /84   Pulse 101   Temp 94.5 °F (34.7 °C)   Resp 16   Ht 154.9 cm (61\")   Wt 81.2 kg (179 lb)   SpO2 94%   BMI 33.82 kg/m²     Physical Exam  Vitals and nursing note reviewed.   Constitutional:       General: She is not in acute distress.     Appearance: Normal appearance.   Cardiovascular:      Rate and Rhythm: Normal rate and regular rhythm.      Pulses: Normal pulses.      Heart sounds: Normal heart sounds. No murmur heard.      Pulmonary:      Effort: Pulmonary effort is normal. No respiratory distress.      Breath sounds: Normal breath sounds.   Abdominal:      General: Abdomen is flat. Bowel sounds are normal.      Palpations: Abdomen is soft.      Tenderness: There is no abdominal tenderness.   Musculoskeletal:      Right lower leg: No edema.      Left lower leg: No edema.   Neurological:      Mental Status: She is alert and oriented to person, place, and time. Mental status is at baseline.   Psychiatric:         Mood and Affect: Mood normal.         Behavior: Behavior normal.          Result Review :   The following data was reviewed by: Sherry Fournier MD on 01/04/2022:  CMP    CMP 8/3/21 11/1/21 12/9/21   Glucose 96 117 (A) 87   BUN 12 15 15 "   Creatinine 0.96 0.78 0.88   eGFR Non African Am 58 (A) 78 67   eGFR African Am 70 90 78   Sodium 138 134 137   Potassium 5.1 4.2 4.4   Chloride 104 98 104   Calcium 9.6 8.9 9.3   Total Protein 6.6 5.9 (A) 6.9   Albumin 4.30 3.4 (A) 4.1   Globulin 2.3 2.5 2.8   Total Bilirubin <0.2 <0.2 <0.2   Alkaline Phosphatase 89 104 91   AST (SGOT) 17 14 14   ALT (SGPT) 19 18 12   (A) Abnormal value       Comments are available for some flowsheets but are not being displayed.           CBC    CBC 11/1/21 11/5/21 12/9/21   WBC 17.9 (A) 13.38 (A) 12.0 (A)   RBC 2.99 (A) 2.92 (A) 3.50 (A)   Hemoglobin 9.5 (A) 9.6 (A) 11.4   Hematocrit 28.7 (A) 29.3 (A) 34.8   MCV 96 100.3 (A) 99 (A)   MCH 31.8 32.9 32.6   MCHC 33.1 32.8 32.8   RDW 13.3 14.6 13.3   Platelets 458 (A) 414 488 (A)   (A) Abnormal value            CBC w/diff    CBC w/Diff 11/1/21 11/5/21 12/9/21   WBC 17.9 (A) 13.38 (A) 12.0 (A)   RBC 2.99 (A) 2.92 (A) 3.50 (A)   Hemoglobin 9.5 (A) 9.6 (A) 11.4   Hematocrit 28.7 (A) 29.3 (A) 34.8   MCV 96 100.3 (A) 99 (A)   MCH 31.8 32.9 32.6   MCHC 33.1 32.8 32.8   RDW 13.3 14.6 13.3   Platelets 458 (A) 414 488 (A)   Neutrophil Rel % 89 80.9 (A) 75   Immature Granulocyte Rel %  2.3 (A)    Lymphocyte Rel % 5 10.1 (A) 16   Monocyte Rel % 3 6.0 6   Eosinophil Rel % 0 0.5 1   Basophil Rel % 0 0.2 1   (A) Abnormal value            Lipid Panel    Lipid Panel 1/13/21 8/3/21 12/9/21   Total Cholesterol 152 142 161   Triglycerides 59 102 112   HDL Cholesterol 97 (A) 71 (A) 87   VLDL Cholesterol 12 18 19   LDL Cholesterol  43 53 55   LDL/HDL Ratio 0.45 0.71    (A) Abnormal value       Comments are available for some flowsheets but are not being displayed.           TSH    TSH 1/13/21   TSH 2.170           Most Recent A1C    HGBA1C Most Recent 12/9/21   Hemoglobin A1C 5.6      Comments are available for some flowsheets but are not being displayed.           Cyclic Citrul Peptide Antibody, IgG / IgA (12/09/2021 14:57)   Rheumatoid Factor  (12/09/2021 14:57)   RAHUL Direct Reflex to 11 Biomarker (12/09/2021 14:57)   C-reactive Protein (12/09/2021 14:57)   Sedimentation Rate (12/09/2021 14:57)     Lab Results   Component Value Date    RAPFLUA Negative 01/04/2022    RAPFLUB Negative 01/04/2022       Assessment and Plan      Diagnoses and all orders for this visit:    1. Primary hypertension (Primary)  Assessment & Plan:  IMPROVED  - BP improved today with BP now in goal range  - cont on ACEi at current dose--> refills sent  - [facility] today to follow up renal function and K+  - Cont checking BP at home intermittently, call if values trend outside of goal range      Orders:  -     Basic Metabolic Panel  -     lisinopril (PRINIVIL,ZESTRIL) 20 MG tablet; Take 1 tablet by mouth Daily.  Dispense: 90 tablet; Refill: 1    2. Abnormal laboratory test  -     Rheumatoid Factor, Quant    3. Generalized body aches  -     POC Influenza A / B      Follow Up   Return in about 4 months (around 5/4/2022) for Annual physical.    Patient was given instructions and counseling regarding her condition or for health maintenance advice. Please see specific information pulled into the AVS if appropriate.     Lyndsay Fournier MD  [facility]  Phone: [phone]  Fax: [phone]     -Pt will need outpatient follow-up On crestor 10 qd  -Will continue atorvastatin 40mg qd

## 2023-11-21 NOTE — PROGRESS NOTE ADULT - ASSESSMENT
77 y/o female with PMH of HTN, HLD presenting with sub-acute headache admitted for concern for GCA. Currently on methylprednisolone. Rheum and ophtho recommending temporal artery biopsy. 79 y/o female with PMH of HTN, HLD presenting with sub-acute headache admitted for concern for GCA s/p temporal artery biopsy 11/21. Continuing prednisone therapy.

## 2023-11-21 NOTE — CHART NOTE - NSCHARTNOTEFT_GEN_A_CORE
Post Operative Note  Patient: PATTI HARRIS 78y (1945) Female   MRN: [id number]  Location: [facility] [address]  Visit: 11-16-23 Inpatient  Date: 11-21-23 @ 01:33    Procedure: S/P Bilateral temporal artery biopsies on 11/21.    Subjective: Patient seen and examined post operatively. Reports pain as controlled. Denies nausea, vomiting, fever, chills, chest pain, SOB, cough. No new or concerning symptoms.       Objective:  Vitals: T(F): 97.3 (11-20-23 @ 21:39), Max: 98.6 (11-20-23 @ 16:04)  HR: 55 (11-20-23 @ 21:39)  BP: 129/71 (11-20-23 @ 21:39) (124/67 - 158/70)  RR: 18 (11-20-23 @ 21:39)  SpO2: 97% (11-20-23 @ 21:39)  Vent Settings:     In:   11-19-23 @ 07:01  -  11-20-23 @ 07:00  --------------------------------------------------------  IN: 480 mL      IV Fluids:     Out:   11-19-23 @ 07:01  -  11-20-23 @ 07:00  --------------------------------------------------------  OUT: 0 mL      EBL:     Voided Urine:   11-19-23 @ 07:01  -  11-20-23 @ 07:00  --------------------------------------------------------  OUT: 0 mL      Physical Examination:  General: NAD, resting comfortably in bed  HEENT: Normocephalic atraumatic, incisions c/d/i  Respiratory: Nonlabored respirations, normal CW expansion.  Cardio: pulse present  Abdomen: soft, nontender  Vascular: extremities are warm and well perfused.     Imaging:  No post-op imaging studies    Assessment:  78yFemale patient S/P Bilateral temporal artery biopsies on 11/21. The patient decided to stay overnight due to time the procedure was finished    Plan:  - Pain control PRN  - Diet: regular  - care per primary team    Date/Time: 11-21-23 @ 01:33
Left a message for patient in regards to follow up care with callback information.

## 2023-11-21 NOTE — PHYSICAL THERAPY INITIAL EVALUATION ADULT - PERTINENT HX OF CURRENT PROBLEM, REHAB EVAL
77 y/o female with PMH of HTN, HLD presenting with "weeks" of bilateral temporal headache. She notes the headache is constant but waxes and wanes in intensity. The headache sometimes radiates to the back of her head. She denies any recent nausea, vomiting, photophobia, phonophobia, changes in sensation, weakness, fevers, chills, shortness of breath, or chest pain. Of note, patient was evaluated in the outpatient setting for her headache. Due to concern for GCA, ESR and CRP were sent and found to be elevated (ESR:90, CRP: 16). Due to difficulties scheduling outpatient biopsy and patient noting vision which was slightly more blurry than usual, she was sent to ED. In ED, patient was vitally stable with labs significant for CRP of 19 and ESR of 75. She was given 1g of methylprednisolone in the ED. Hosp course: 77 y/o female with PMH of HTN, HLD presenting with "weeks" of bilateral temporal headache. She notes the headache is constant but waxes and wanes in intensity. The headache sometimes radiates to the back of her head. She denies any recent nausea, vomiting, photophobia, phonophobia, changes in sensation, weakness, fevers, chills, shortness of breath, or chest pain. Of note, patient was evaluated in the outpatient setting for her headache. Due to concern for GCA, ESR and CRP were sent and found to be elevated (ESR:90, CRP: 16). Due to difficulties scheduling outpatient biopsy and patient noting vision which was slightly more blurry than usual, she was sent to ED. In ED, patient was vitally stable with labs significant for CRP of 19 and ESR of 75. She was given 1g of methylprednisolone in the ED. Hosp course: VA duplex carotid (11/18) No significant hemodynamic stenosis of either carotid artery. MRI orbits (11/20) Unremarkable examination of the orbits. Slight prominence to the vessels overlying the left temple without evidence of significant inflammatory change or juliana intraluminal filling defect.  Pt is S/P Bilateral temporal artery biopsies on 11/21.

## 2023-11-21 NOTE — PHYSICAL THERAPY INITIAL EVALUATION ADULT - IMPAIRMENTS CONTRIBUTING TO GAIT DEVIATIONS, PT EVAL
normal appearance , without tenderness upon palpation , no deformities , trachea midline , Thyroid normal size , no masses , thyroid nontender , normal appearance , without tenderness upon palpation , no deformities , trachea midline , Thyroid normal size , no masses , thyroid nontender
impaired balance/decreased strength

## 2023-11-21 NOTE — PROGRESS NOTE ADULT - PROBLEM SELECTOR PLAN 2
IOP elevated on ophtho exam  -Latanoprost qhs to both eyes Suspect in setting of being NPO 11/20  Orthostatics (-)  -Will continue to monitor for improvement

## 2023-11-21 NOTE — PHYSICAL THERAPY INITIAL EVALUATION ADULT - ADDITIONAL COMMENTS
Patient resides on the 3rd level of an apartment with elevator access. Pt independent with all functional mobility/ADLs prior to admission. pt does not own any DME

## 2023-11-21 NOTE — PROGRESS NOTE ADULT - ASSESSMENT
A 78 year old with PMHx of HTN presents with bilateral temporal headaches with elevated CRP and ESR. Vascular surgery is consulted for temporal artery biopsy. Pt previously refusing surgery. Discussed procedure with pt last night and pt now amenable to the idea of surgery.    Recommendations:  - TAB 11/21- recovering appropriately  - f/u Bx results  - No further vascular intervention indicated. Please reconsult PRN  - Rest of care per primary

## 2023-11-21 NOTE — PROGRESS NOTE ADULT - ATTENDING COMMENTS
78y F pmh htn, hld here for c/o persistent b/l temporal HA for weeks found to have elevated ESR/CRP concerning for GCA  pulse dose steroids completed x 3 days  on prednisone 50mg, taper by 10mg weekly  appreciate rheum, optho, vasc, neuro recs  mri orbits slight prominence of the vessels  s/p temporal atery biopsy-   felt dizzy this morning- orthostatics neg but hypertensive- hydral 25mg Po given- can increase to losartan 50mg for tomorrow AM if remains hypertensive  PT eval

## 2023-11-21 NOTE — PROGRESS NOTE ADULT - PROBLEM SELECTOR PLAN 1
Weeks of temporal bilateral headache, slight blurred vision recently  CRP of 19, ESR of 75  Evaluated by ophthalmology and found not to have any disc edema or ophthalmologic manifestations of GCA on exam  Declined CT, MRI in ED. Daughter reports she had these done recently and they were unremarkable  Rheum consulted, appreciate recs  Plan:  -Methylprednisolone 1000mg x1 then 500mg x2 qd then prednisone 1mg/kg/day   -Pantoprazole 40mg daily for ppx on steroids  -Bactrim PCP prophylaxis  -vascular surgery consulted for temporal artery biopsy, patient reportedly agreable to bx per vascular 11/19 Weeks of temporal bilateral headache, slight blurred vision recently  CRP of 19, ESR of 75  Evaluated by ophthalmology and found not to have any disc edema or ophthalmologic manifestations of GCA on exam  Rheum consulted, appreciate recs  S/p L temporal artery biopsy 11/20  Now with headache  Plan:  -Methylprednisolone 1000mg x1 then 500mg x2 qd  -Currently on prednisone taper  -Pantoprazole 40mg daily for ppx on steroids  -Bactrim PCP prophylaxis  -Ibuprofen for headache

## 2023-11-21 NOTE — PROGRESS NOTE ADULT - PROBLEM SELECTOR PLAN 7
DVT proph: Lovenox  Diet: Regular  Activity: Normal  GOC: Full code Patient planned for temporal artery bx 11/20/23.  Pt w/ >10METS. No active chest pain. Pt w/o any cardiac history.   RCRI=0. Patint is low risk for low risk procedure.   Patient is medically optimized and no further w/u prior to planned biopsy   Hold losartan 11/20/23

## 2023-11-21 NOTE — PROGRESS NOTE ADULT - SUBJECTIVE AND OBJECTIVE BOX
NEUROLOGY FOLLOW-UP CONSULT NOTE    RFC: blurry vision and headache    Interval history: No acute neurologic events overnight. Patient declined Israeli . She states that she is able to understand/communicate in English.    Meds:  MEDICATIONS  (STANDING):  atorvastatin 40 milliGRAM(s) Oral at bedtime  enoxaparin Injectable 40 milliGRAM(s) SubCutaneous every 24 hours  hydrALAZINE 25 milliGRAM(s) Oral once  latanoprost 0.005% Ophthalmic Solution 1 Drop(s) Both EYES at bedtime  losartan 25 milliGRAM(s) Oral daily  pantoprazole    Tablet 40 milliGRAM(s) Oral before breakfast  predniSONE   Tablet 50 milliGRAM(s) Oral daily  sodium chloride 0.65% Nasal 1 Spray(s) Both Nostrils two times a day  trimethoprim  160 mG/sulfamethoxazole 800 mG 1 Tablet(s) Oral <User Schedule>    MEDICATIONS  (PRN):  acetaminophen     Tablet .. 650 milliGRAM(s) Oral every 6 hours PRN Temp greater or equal to 38C (100.4F), Mild Pain (1 - 3)  aluminum hydroxide/magnesium hydroxide/simethicone Suspension 30 milliLiter(s) Oral every 4 hours PRN Dyspepsia  melatonin 3 milliGRAM(s) Oral at bedtime PRN Insomnia  ondansetron Injectable 4 milliGRAM(s) IV Push every 8 hours PRN Nausea and/or Vomiting      PMHx/PSHx/FHx/SHx:    GCA (giant cell arteritis)    Glaucoma    Hypertension    Hyperlipidemia    Macular degeneration    Blurred vision    Elevated sedimentation rate    Daily headache    Elevated sedimentation rate        Allergies:  No Known Allergies      ROS: All systems negative except as documented in Interval history    O:  T(C): 36.8 (11-21-23 @ 09:05), Max: 37 (11-20-23 @ 16:04)  T(F): 98.2 (11-21-23 @ 09:05), Max: 98.6 (11-20-23 @ 16:04)  HR: 81 (11-21-23 @ 09:05) (55 - 81)  BP: 126/68 (11-21-23 @ 09:05) (124/67 - 158/70)  RR: 18 (11-21-23 @ 09:05) (16 - 18)  SpO2: 98% (11-21-23 @ 09:05) (96% - 100%)  Wt(kg): --    Focused neurologic exam:  MS - AAO x3, speech fluent, rep/naming intact, follows commands, attn/conc/recent and remote memory/fund of knowledge WNL  CN - PERRLA, EOMI, VFF, face sens/str/hearing WNL b/l, tongue/palate midline, trap 5/5 b/l  Motor - Normal bulk/tone, 5/5 all  Sens - LT/temp/vib intact all  DTR's - 3+ b/l UE, 2+ b/l LE and downgoing b/l plantar response  Coord - FtN intact b/l  Gait and station - Normal casual gait.     Pertinent labs/studies:      LABS:  cret                        10.0   5.63  )-----------( 310      ( 20 Nov 2023 04:15 )             30.4     11-20    142  |  107  |  38<H>  ----------------------------<  140<H>  4.6   |  24  |  1.03    Ca    9.3      20 Nov 2023 04:15  Phos  3.0     11-20  Mg     2.1     11-20    TPro  6.1  /  Alb  3.5  /  TBili  0.4  /  DBili  x   /  AST  18  /  ALT  13  /  AlkPhos  74  11-20    PT/INR - ( 20 Nov 2023 04:15 )   PT: 11.3 sec;   INR: 1.03 ratio         PTT - ( 20 Nov 2023 04:15 )  PTT:27.4 sec    < from: VA Duplex Carotid, Bilat (11.18.23 @ 17:47) >  IMPRESSION: No significant hemodynamic stenosis of either carotid artery.     < end of copied text >    < from: MR Orbits w/wo IV Cont (11.20.23 @ 11:15) >  FINDINGS:    The globes are fairly symmetric in size and shape. Status post cataract   surgery. There is no intraocular hemorrhage or gross mass. The lacrimal   glands are unremarkable. There is no significant inflammatory change or   gross mass in the retrobulbar fat. The extraocular muscles have the   appropriate MR signal and muscle bulk. The optic nerves are within normal   limits. The optic chiasm and visualized optic tracts are likewise within   normal limits.    The sella is unremarkable. The cavernous sinuses are within normal   limits. The ICA flow voids are well maintained. There is no gross   enhancement of the remaining cranial nerves.    There is no diffusion restriction to suggest acute infarct. A few   nonspecific foci of increased T2/FLAIR signal in the subcortical and deep   white matter. There is no hydrocephalus. The visualized extra axial   spaces and basal cisterns are within normal limits. There is no midline   shift or mass effect present. There is no abnormal enhancement within the   visualized brain parenchyma.    The craniocervical junction is within normal limits. The major   intracranial vessels demonstrate expected signal void related vascular   flow. Mild mucosal thickening to the ethmoid air cells. Bilateral mastoid   effusions.    Slight prominence to the vessels overlying the left temple without   evidence of significant inflammatory change or juliana intraluminal filling   defect.      IMPRESSION:    1.  Unremarkable examination of the orbits.  2.  Slight prominence to the vessels overlying the left temple without   evidence of significant inflammatory change or juliana intraluminal filling   defect.    < end of copied text >

## 2023-11-21 NOTE — PROGRESS NOTE ADULT - ASSESSMENT
Patient PATTI HARRIS is a 78y (1945)with PMH significant for cervical cancer (s/p hysterectomy), HTN, HLD, RIGHT handed woman who presented to [facility] ED on 11/16/2023, at the behest of her neurologist, Dr. Fallon, with c/o GREENFIELD and blurred vision x 1 month.    Impression: Symptoms concerning for GCA. Dizziness exacerbated with standing up and walking possibly related to orthostatic hypotension vs cardiac etiology vs peripheral etiology. No acute cerebrovascular or acute intracranial pathology on MR brain. 11/21: Patient reports improvement of headache but states she cannot go home because she is very dizzy when she walks    Recommendations:  - please obtain orthostatic VS and consider CTA H/N vessel imaging.  - cardiac workup per primary team  -MRI brain and orbits w/wo con: stable, no acute intracranial pathology. Unremarkable exam of the orbits.  - s/p temporal artery biopsy today, pain management per primary team  -steroid dosing as per rheumatology, appreciate recommendations. pt currently on prednisone 50mg PO daily  -continue PPI with steroids  -continue melatonin PRN for insomnia, and try to avoid steroid dosing near bedtime  - rest of care per primary team  - will continue to follow up    Plans discussed with neurology attending, Dr. Pyle   Patient PATTI HARRIS is a 78y (1945)with PMH significant for cervical cancer (s/p hysterectomy), HTN, HLD, RIGHT handed woman who presented to [facility] ED on 11/16/2023, at the behest of her neurologist, Dr. Fallon, with c/o GREENFIELD and blurred vision x 1 month.    Impression: Symptoms concerning for GCA. Dizziness exacerbated with standing up and walking possibly related to dehydration vs cardiac etiology vs peripheral vestibular etiology. No acute cerebrovascular or acute intracranial pathology on MR brain. 11/21: Patient reports improvement of headache but states she cannot go home because she is very dizzy when she walks    Recommendations:  - orthostatic VS (negative)  - consider CTA H/N vessel imaging.  - cardiac workup per primary team  -MRI brain and orbits w/wo con: stable, no acute intracranial pathology. Unremarkable exam of the orbits.  - s/p temporal artery biopsy today, pain management per primary team  -steroid dosing as per rheumatology, appreciate recommendations. pt currently on prednisone 50mg PO daily  -continue PPI with steroids  -continue melatonin PRN for insomnia, and try to avoid steroid dosing near bedtime  - rest of care per primary team  - will continue to follow up    Plans discussed with neurology attending, Dr. Pyle

## 2023-11-21 NOTE — PROGRESS NOTE ADULT - PROBLEM SELECTOR PLAN 6
Patient planned for temporal artery bx 11/20/23.  Pt w/ >10METS. No active chest pain. Pt w/o any cardiac history.   RCRI=0. Patint is low risk for low risk procedure.   Patient is medically optimized and no further w/u prior to planned biopsy   Hold losartan 11/20/23 -Pt will need outpatient follow-up

## 2023-11-22 ENCOUNTER — TRANSCRIPTION ENCOUNTER (OUTPATIENT)
Age: 78
End: 2023-11-22

## 2023-11-22 ENCOUNTER — NON-APPOINTMENT (OUTPATIENT)
Age: 78
End: 2023-11-22

## 2023-11-22 VITALS
OXYGEN SATURATION: 97 % | RESPIRATION RATE: 18 BRPM | HEART RATE: 72 BPM | SYSTOLIC BLOOD PRESSURE: 155 MMHG | TEMPERATURE: 98 F | DIASTOLIC BLOOD PRESSURE: 75 MMHG

## 2023-11-22 PROBLEM — Z00.00 ENCOUNTER FOR PREVENTIVE HEALTH EXAMINATION: Status: ACTIVE | Noted: 2023-11-22

## 2023-11-22 LAB
ANION GAP SERPL CALC-SCNC: 11 MMOL/L — SIGNIFICANT CHANGE UP (ref 5–17)
ANION GAP SERPL CALC-SCNC: 11 MMOL/L — SIGNIFICANT CHANGE UP (ref 5–17)
BUN SERPL-MCNC: 29 MG/DL — HIGH (ref 7–23)
BUN SERPL-MCNC: 29 MG/DL — HIGH (ref 7–23)
CALCIUM SERPL-MCNC: 8.8 MG/DL — SIGNIFICANT CHANGE UP (ref 8.4–10.5)
CALCIUM SERPL-MCNC: 8.8 MG/DL — SIGNIFICANT CHANGE UP (ref 8.4–10.5)
CHLORIDE SERPL-SCNC: 104 MMOL/L — SIGNIFICANT CHANGE UP (ref 96–108)
CHLORIDE SERPL-SCNC: 104 MMOL/L — SIGNIFICANT CHANGE UP (ref 96–108)
CO2 SERPL-SCNC: 25 MMOL/L — SIGNIFICANT CHANGE UP (ref 22–31)
CO2 SERPL-SCNC: 25 MMOL/L — SIGNIFICANT CHANGE UP (ref 22–31)
CREAT SERPL-MCNC: 0.94 MG/DL — SIGNIFICANT CHANGE UP (ref 0.5–1.3)
CREAT SERPL-MCNC: 0.94 MG/DL — SIGNIFICANT CHANGE UP (ref 0.5–1.3)
EGFR: 62 ML/MIN/1.73M2 — SIGNIFICANT CHANGE UP
EGFR: 62 ML/MIN/1.73M2 — SIGNIFICANT CHANGE UP
GLUCOSE SERPL-MCNC: 79 MG/DL — SIGNIFICANT CHANGE UP (ref 70–99)
GLUCOSE SERPL-MCNC: 79 MG/DL — SIGNIFICANT CHANGE UP (ref 70–99)
HCT VFR BLD CALC: 32.3 % — LOW (ref 34.5–45)
HCT VFR BLD CALC: 32.3 % — LOW (ref 34.5–45)
HGB BLD-MCNC: 10.6 G/DL — LOW (ref 11.5–15.5)
HGB BLD-MCNC: 10.6 G/DL — LOW (ref 11.5–15.5)
MAGNESIUM SERPL-MCNC: 2.2 MG/DL — SIGNIFICANT CHANGE UP (ref 1.6–2.6)
MAGNESIUM SERPL-MCNC: 2.2 MG/DL — SIGNIFICANT CHANGE UP (ref 1.6–2.6)
MCHC RBC-ENTMCNC: 31 PG — SIGNIFICANT CHANGE UP (ref 27–34)
MCHC RBC-ENTMCNC: 31 PG — SIGNIFICANT CHANGE UP (ref 27–34)
MCHC RBC-ENTMCNC: 32.8 GM/DL — SIGNIFICANT CHANGE UP (ref 32–36)
MCHC RBC-ENTMCNC: 32.8 GM/DL — SIGNIFICANT CHANGE UP (ref 32–36)
MCV RBC AUTO: 94.4 FL — SIGNIFICANT CHANGE UP (ref 80–100)
MCV RBC AUTO: 94.4 FL — SIGNIFICANT CHANGE UP (ref 80–100)
NRBC # BLD: 0 /100 WBCS — SIGNIFICANT CHANGE UP (ref 0–0)
NRBC # BLD: 0 /100 WBCS — SIGNIFICANT CHANGE UP (ref 0–0)
PHOSPHATE SERPL-MCNC: 2.1 MG/DL — LOW (ref 2.5–4.5)
PHOSPHATE SERPL-MCNC: 2.1 MG/DL — LOW (ref 2.5–4.5)
PLATELET # BLD AUTO: 310 K/UL — SIGNIFICANT CHANGE UP (ref 150–400)
PLATELET # BLD AUTO: 310 K/UL — SIGNIFICANT CHANGE UP (ref 150–400)
POTASSIUM SERPL-MCNC: 4 MMOL/L — SIGNIFICANT CHANGE UP (ref 3.5–5.3)
POTASSIUM SERPL-MCNC: 4 MMOL/L — SIGNIFICANT CHANGE UP (ref 3.5–5.3)
POTASSIUM SERPL-SCNC: 4 MMOL/L — SIGNIFICANT CHANGE UP (ref 3.5–5.3)
POTASSIUM SERPL-SCNC: 4 MMOL/L — SIGNIFICANT CHANGE UP (ref 3.5–5.3)
RBC # BLD: 3.42 M/UL — LOW (ref 3.8–5.2)
RBC # BLD: 3.42 M/UL — LOW (ref 3.8–5.2)
RBC # FLD: 15.2 % — HIGH (ref 10.3–14.5)
RBC # FLD: 15.2 % — HIGH (ref 10.3–14.5)
SODIUM SERPL-SCNC: 140 MMOL/L — SIGNIFICANT CHANGE UP (ref 135–145)
SODIUM SERPL-SCNC: 140 MMOL/L — SIGNIFICANT CHANGE UP (ref 135–145)
SURGICAL PATHOLOGY STUDY: SIGNIFICANT CHANGE UP
SURGICAL PATHOLOGY STUDY: SIGNIFICANT CHANGE UP
WBC # BLD: 6.11 K/UL — SIGNIFICANT CHANGE UP (ref 3.8–10.5)
WBC # BLD: 6.11 K/UL — SIGNIFICANT CHANGE UP (ref 3.8–10.5)
WBC # FLD AUTO: 6.11 K/UL — SIGNIFICANT CHANGE UP (ref 3.8–10.5)
WBC # FLD AUTO: 6.11 K/UL — SIGNIFICANT CHANGE UP (ref 3.8–10.5)

## 2023-11-22 PROCEDURE — 85610 PROTHROMBIN TIME: CPT

## 2023-11-22 PROCEDURE — 86900 BLOOD TYPING SEROLOGIC ABO: CPT

## 2023-11-22 PROCEDURE — 85652 RBC SED RATE AUTOMATED: CPT

## 2023-11-22 PROCEDURE — 84443 ASSAY THYROID STIM HORMONE: CPT

## 2023-11-22 PROCEDURE — 80074 ACUTE HEPATITIS PANEL: CPT

## 2023-11-22 PROCEDURE — 36415 COLL VENOUS BLD VENIPUNCTURE: CPT

## 2023-11-22 PROCEDURE — 80053 COMPREHEN METABOLIC PANEL: CPT

## 2023-11-22 PROCEDURE — 86850 RBC ANTIBODY SCREEN: CPT

## 2023-11-22 PROCEDURE — A9585: CPT

## 2023-11-22 PROCEDURE — 86803 HEPATITIS C AB TEST: CPT

## 2023-11-22 PROCEDURE — 85025 COMPLETE CBC W/AUTO DIFF WBC: CPT

## 2023-11-22 PROCEDURE — 86140 C-REACTIVE PROTEIN: CPT

## 2023-11-22 PROCEDURE — 99232 SBSQ HOSP IP/OBS MODERATE 35: CPT

## 2023-11-22 PROCEDURE — 99285 EMERGENCY DEPT VISIT HI MDM: CPT | Mod: 25

## 2023-11-22 PROCEDURE — 84100 ASSAY OF PHOSPHORUS: CPT

## 2023-11-22 PROCEDURE — 85730 THROMBOPLASTIN TIME PARTIAL: CPT

## 2023-11-22 PROCEDURE — 97161 PT EVAL LOW COMPLEX 20 MIN: CPT

## 2023-11-22 PROCEDURE — 83735 ASSAY OF MAGNESIUM: CPT

## 2023-11-22 PROCEDURE — 86901 BLOOD TYPING SEROLOGIC RH(D): CPT

## 2023-11-22 PROCEDURE — 85027 COMPLETE CBC AUTOMATED: CPT

## 2023-11-22 PROCEDURE — 88313 SPECIAL STAINS GROUP 2: CPT

## 2023-11-22 PROCEDURE — 70543 MRI ORBT/FAC/NCK W/O &W/DYE: CPT

## 2023-11-22 PROCEDURE — 99239 HOSP IP/OBS DSCHRG MGMT >30: CPT | Mod: GC

## 2023-11-22 PROCEDURE — 70498 CT ANGIOGRAPHY NECK: CPT

## 2023-11-22 PROCEDURE — 70496 CT ANGIOGRAPHY HEAD: CPT

## 2023-11-22 PROCEDURE — 93880 EXTRACRANIAL BILAT STUDY: CPT

## 2023-11-22 PROCEDURE — 88305 TISSUE EXAM BY PATHOLOGIST: CPT

## 2023-11-22 PROCEDURE — 96374 THER/PROPH/DIAG INJ IV PUSH: CPT

## 2023-11-22 PROCEDURE — 86480 TB TEST CELL IMMUN MEASURE: CPT

## 2023-11-22 PROCEDURE — C1889: CPT

## 2023-11-22 PROCEDURE — 80048 BASIC METABOLIC PNL TOTAL CA: CPT

## 2023-11-22 RX ORDER — LATANOPROST 0.05 MG/ML
1 SOLUTION/ DROPS OPHTHALMIC; TOPICAL
Qty: 1 | Refills: 1
Start: 2023-11-22 | End: 2024-01-20

## 2023-11-22 RX ADMIN — Medication 50 MILLIGRAM(S): at 06:23

## 2023-11-22 RX ADMIN — PANTOPRAZOLE SODIUM 40 MILLIGRAM(S): 20 TABLET, DELAYED RELEASE ORAL at 06:23

## 2023-11-22 RX ADMIN — ENOXAPARIN SODIUM 40 MILLIGRAM(S): 100 INJECTION SUBCUTANEOUS at 06:23

## 2023-11-22 RX ADMIN — LOSARTAN POTASSIUM 25 MILLIGRAM(S): 100 TABLET, FILM COATED ORAL at 06:23

## 2023-11-22 RX ADMIN — Medication 1 SPRAY(S): at 06:23

## 2023-11-22 RX ADMIN — Medication 1 TABLET(S): at 09:59

## 2023-11-22 NOTE — PROGRESS NOTE ADULT - SUBJECTIVE AND OBJECTIVE BOX
NEUROLOGY FOLLOW-UP CONSULT NOTE    RFC: blurry vision and headache    Interval history: No acute neurologic events overnight. Patient declined Scottish . She states that she is able to understand/communicate in English. Patient reports improvement of dizziness with oral hydration. She denies any dizziness when she ambulates today.    Meds:  MEDICATIONS  (STANDING):  atorvastatin 40 milliGRAM(s) Oral at bedtime  enoxaparin Injectable 40 milliGRAM(s) SubCutaneous every 24 hours  hydrALAZINE 25 milliGRAM(s) Oral once  latanoprost 0.005% Ophthalmic Solution 1 Drop(s) Both EYES at bedtime  losartan 25 milliGRAM(s) Oral daily  pantoprazole    Tablet 40 milliGRAM(s) Oral before breakfast  predniSONE   Tablet 50 milliGRAM(s) Oral daily  sodium chloride 0.65% Nasal 1 Spray(s) Both Nostrils two times a day  trimethoprim  160 mG/sulfamethoxazole 800 mG 1 Tablet(s) Oral <User Schedule>    MEDICATIONS  (PRN):  acetaminophen     Tablet .. 650 milliGRAM(s) Oral every 6 hours PRN Temp greater or equal to 38C (100.4F), Mild Pain (1 - 3)  aluminum hydroxide/magnesium hydroxide/simethicone Suspension 30 milliLiter(s) Oral every 4 hours PRN Dyspepsia  melatonin 3 milliGRAM(s) Oral at bedtime PRN Insomnia  ondansetron Injectable 4 milliGRAM(s) IV Push every 8 hours PRN Nausea and/or Vomiting      PMHx/PSHx/FHx/SHx:    GCA (giant cell arteritis)    Glaucoma    Hypertension    Hyperlipidemia    Macular degeneration    Blurred vision    Elevated sedimentation rate    Daily headache    Elevated sedimentation rate        Allergies:  No Known Allergies      ROS: All systems negative except as documented in Interval history    O:  T(C): 36.8 (11-22-23 @ 04:46), Max: 37.2 (11-21-23 @ 12:20)  T(F): 98.3 (11-22-23 @ 04:46), Max: 98.9 (11-21-23 @ 12:20)  HR: 71 (11-22-23 @ 04:46) (65 - 72)  BP: 108/64 (11-22-23 @ 04:46) (108/64 - 161/74)  RR: 18 (11-22-23 @ 04:46) (18 - 18)  SpO2: 99% (11-22-23 @ 04:46) (97% - 100%)  Wt(kg): --    Focused neurologic exam:  MS - AAO x3, speech fluent, rep/naming intact, follows commands, attn/conc/recent and remote memory/fund of knowledge WNL  CN - PERRLA, EOMI, VFF, face sens/str/hearing WNL b/l, tongue/palate midline, trap 5/5 b/l  Motor - Normal bulk/tone, 5/5 all  Sens - LT/temp/vib intact all  DTR's - 3+ b/l UE, 2+ b/l LE and downgoing b/l plantar response  Coord - FtN intact b/l  Gait and station - Normal casual gait.     Pertinent labs/studies:    LABS:  cret                        10.6   6.11  )-----------( 310      ( 22 Nov 2023 07:25 )             32.3     11-22    140  |  104  |  29<H>  ----------------------------<  79  4.0   |  25  |  0.94    Ca    8.8      22 Nov 2023 07:22  Phos  2.1     11-22  Mg     2.2     11-22      < from: VA Duplex Carotid, Bilat (11.18.23 @ 17:47) >  IMPRESSION: No significant hemodynamic stenosis of either carotid artery.     < end of copied text >    < from: MR Orbits w/wo IV Cont (11.20.23 @ 11:15) >  FINDINGS:    The globes are fairly symmetric in size and shape. Status post cataract   surgery. There is no intraocular hemorrhage or gross mass. The lacrimal   glands are unremarkable. There is no significant inflammatory change or   gross mass in the retrobulbar fat. The extraocular muscles have the   appropriate MR signal and muscle bulk. The optic nerves are within normal   limits. The optic chiasm and visualized optic tracts are likewise within   normal limits.    The sella is unremarkable. The cavernous sinuses are within normal   limits. The ICA flow voids are well maintained. There is no gross   enhancement of the remaining cranial nerves.    There is no diffusion restriction to suggest acute infarct. A few   nonspecific foci of increased T2/FLAIR signal in the subcortical and deep   white matter. There is no hydrocephalus. The visualized extra axial   spaces and basal cisterns are within normal limits. There is no midline   shift or mass effect present. There is no abnormal enhancement within the   visualized brain parenchyma.    The craniocervical junction is within normal limits. The major   intracranial vessels demonstrate expected signal void related vascular   flow. Mild mucosal thickening to the ethmoid air cells. Bilateral mastoid   effusions.    Slight prominence to the vessels overlying the left temple without   evidence of significant inflammatory change or juliana intraluminal filling   defect.      IMPRESSION:    1.  Unremarkable examination of the orbits.  2.  Slight prominence to the vessels overlying the left temple without   evidence of significant inflammatory change or juliana intraluminal filling   defect.    < end of copied text >   NEUROLOGY FOLLOW-UP CONSULT NOTE    RFC: blurry vision and headache    Interval history: No acute neurologic events overnight. Patient declined [place] Verde[place] . She states that she is able to understand/communicate in English. Patient reports improvement of dizziness with oral hydration. She denies any dizziness when she ambulates today.    Meds:  MEDICATIONS  (STANDING):  atorvastatin 40 milliGRAM(s) Oral at bedtime  enoxaparin Injectable 40 milliGRAM(s) SubCutaneous every 24 hours  hydrALAZINE 25 milliGRAM(s) Oral once  latanoprost 0.005% Ophthalmic Solution 1 Drop(s) Both EYES at bedtime  losartan 25 milliGRAM(s) Oral daily  pantoprazole    Tablet 40 milliGRAM(s) Oral before breakfast  predniSONE   Tablet 50 milliGRAM(s) Oral daily  sodium chloride 0.65% Nasal 1 Spray(s) Both Nostrils two times a day  trimethoprim  160 mG/sulfamethoxazole 800 mG 1 Tablet(s) Oral <User Schedule>    MEDICATIONS  (PRN):  acetaminophen     Tablet .. 650 milliGRAM(s) Oral every 6 hours PRN Temp greater or equal to 38C (100.4F), Mild Pain (1 - 3)  aluminum hydroxide/magnesium hydroxide/simethicone Suspension 30 milliLiter(s) Oral every 4 hours PRN Dyspepsia  melatonin 3 milliGRAM(s) Oral at bedtime PRN Insomnia  ondansetron Injectable 4 milliGRAM(s) IV Push every 8 hours PRN Nausea and/or Vomiting      PMHx/PSHx/FHx/SHx:    GCA (giant cell arteritis)    Glaucoma    Hypertension    Hyperlipidemia    Macular degeneration    Blurred vision    Elevated sedimentation rate    Daily headache    Elevated sedimentation rate        Allergies:  No Known Allergies      ROS: All systems negative except as documented in Interval history    O:  T(C): 36.8 (11-22-23 @ 04:46), Max: 37.2 (11-21-23 @ 12:20)  T(F): 98.3 (11-22-23 @ 04:46), Max: 98.9 (11-21-23 @ 12:20)  HR: 71 (11-22-23 @ 04:46) (65 - 72)  BP: 108/64 (11-22-23 @ 04:46) (108/64 - 161/74)  RR: 18 (11-22-23 @ 04:46) (18 - 18)  SpO2: 99% (11-22-23 @ 04:46) (97% - 100%)  Wt(kg): --    Focused neurologic exam:  MS - AAO x3, speech fluent, rep/naming intact, follows commands, attn/conc/recent and remote memory/fund of knowledge WNL  CN - PERRLA, EOMI, VFF, face sens/str/hearing WNL b/l, tongue/palate midline, trap 5/5 b/l  Motor - Normal bulk/tone, 5/5 all  Sens - LT/temp/vib intact all  DTR's - 3+ b/l UE, 2+ b/l LE and downgoing b/l plantar response  Coord - FtN intact b/l  Gait and station - Normal casual gait.     Pertinent labs/studies:    LABS:  cret                        10.6   6.11  )-----------( 310      ( 22 Nov 2023 07:25 )             32.3     11-22    140  |  104  |  29<H>  ----------------------------<  79  4.0   |  25  |  0.94    Ca    8.8      22 Nov 2023 07:22  Phos  2.1     11-22  Mg     2.2     11-22      < from: VA Duplex Carotid, Bilat (11.18.23 @ 17:47) >  IMPRESSION: No significant hemodynamic stenosis of either carotid artery.     < end of copied text >    < from: MR Orbits w/wo IV Cont (11.20.23 @ 11:15) >  FINDINGS:    The globes are fairly symmetric in size and shape. Status post cataract   surgery. There is no intraocular hemorrhage or gross mass. The lacrimal   glands are unremarkable. There is no significant inflammatory change or   gross mass in the retrobulbar fat. The extraocular muscles have the   appropriate MR signal and muscle bulk. The optic nerves are within normal   limits. The optic chiasm and visualized optic tracts are likewise within   normal limits.    The sella is unremarkable. The cavernous sinuses are within normal   limits. The ICA flow voids are well maintained. There is no gross   enhancement of the remaining cranial nerves.    There is no diffusion restriction to suggest acute infarct. A few   nonspecific foci of increased T2/FLAIR signal in the subcortical and deep   white matter. There is no hydrocephalus. The visualized extra axial   spaces and basal cisterns are within normal limits. There is no midline   shift or mass effect present. There is no abnormal enhancement within the   visualized brain parenchyma.    The craniocervical junction is within normal limits. The major   intracranial vessels demonstrate expected signal void related vascular   flow. Mild mucosal thickening to the ethmoid air cells. Bilateral mastoid   effusions.    Slight prominence to the vessels overlying the left temple without   evidence of significant inflammatory change or juliana intraluminal filling   defect.      IMPRESSION:    1.  Unremarkable examination of the orbits.  2.  Slight prominence to the vessels overlying the left temple without   evidence of significant inflammatory change or juliana intraluminal filling   defect.    < end of copied text >    < from: CT Angio Head w/ IV Cont (11.21.23 @ 20:43) >  IMPRESSION:    CT HEAD: No acute intra-cranial hemorrhage, mass effect, or midline   shift. There is air and postprocedural inflammation in the bilateral   temporal soft tissues, compatible with recent temporal artery biopsies.    CTA BRAIN: Patent intracranial circulation. No flow-limiting stenosis or   occlusion.    CTA NECK: Patentcervical vasculature. No flow limiting stenosis or   occlusion.    < end of copied text >

## 2023-11-22 NOTE — PROGRESS NOTE ADULT - ASSESSMENT
Patient PATTI HARRIS is a 78y (1945)with PMH significant for cervical cancer (s/p hysterectomy), HTN, HLD, RIGHT handed woman who presented to [facility] ED on 11/16/2023, at the behest of her neurologist, Dr. Fallon, with c/o GREENFIELD and blurred vision x 1 month.    Impression: Symptoms concerning for GCA. Dizziness exacerbated with standing up and walking possibly related to dehydration given symptoms improved significantly with oral hydration. No acute cerebrovascular or acute intracranial pathology on MR brain. 11/22: Patient reports improvement of headache and dizziness    Recommendations:  - orthostatic VS (negative)  - given patient's dizziness has improved with hydration, defer further vessel imaging to primary team.  - cardiac workup per primary team if dizziness reoccurs  - continue to follow up with vascular and rheumatology team  -MRI brain and orbits w/wo con: stable, no acute intracranial pathology. Unremarkable exam of the orbits.  - s/p temporal artery biopsy today, pain management per primary team  -steroid dosing as per rheumatology, appreciate recommendations. pt currently on prednisone 50mg PO daily  -continue PPI with steroids  -continue melatonin PRN for insomnia, and try to avoid steroid dosing near bedtime  - rest of care per primary team  - No further inpatient neurology recommendation, will sign off, please call consult service [phone] with any questions.  - Patient can follow up with general neurology at [address] [place] [address] 1-2 weeks after discharge by calling [phone] to schedule this appointment      Plans discussed with neurology attending, Dr. Pyle   Patient PATTI HARRIS is a 78y (1945)with PMH significant for cervical cancer (s/p hysterectomy), HTN, HLD, RIGHT handed woman who presented to [facility] ED on 11/16/2023, at the behest of her neurologist, Dr. Fallon, with c/o GREENFIELD and blurred vision x 1 month.    Impression: Symptoms concerning for GCA. Dizziness exacerbated with standing up and walking possibly related to dehydration given symptoms improved significantly with oral hydration. No acute cerebrovascular or acute intracranial pathology on MR brain. 11/22: Patient reports improvement of headache and dizziness    Recommendations:  - orthostatic VS (negative)  -  CTA H/N completed, pending final report  - cardiac workup per primary team if dizziness reoccurs  - continue to follow up with vascular and rheumatology team  -MRI brain and orbits w/wo con: stable, no acute intracranial pathology. Unremarkable exam of the orbits.  - s/p temporal artery biopsy today, pain management per primary team  -steroid dosing as per rheumatology, appreciate recommendations. pt currently on prednisone 50mg PO daily  -continue PPI with steroids  -continue melatonin PRN for insomnia, and try to avoid steroid dosing near bedtime  - rest of care per primary team  - No further inpatient neurology recommendation, will sign off, please call consult service [phone] with any questions.  - Patient can follow up with general neurology at [address] [place] [address] 1-2 weeks after discharge by calling [phone] to schedule this appointment      Plans discussed with neurology attending, Dr. Pyle   Patient PATTI HARRIS is a 78y (1945)with PMH significant for cervical cancer (s/p hysterectomy), HTN, HLD, RIGHT handed woman who presented to [facility] ED on 11/16/2023, at the behest of her neurologist, Dr. Fallon, with c/o GREENFIELD and blurred vision x 1 month.    Impression: Symptoms concerning for GCA. Dizziness exacerbated with standing up and walking possibly related to dehydration given symptoms improved significantly with oral hydration. No acute cerebrovascular or acute intracranial pathology on MR brain. normal CTA H/N. 11/22: Patient reports improvement of headache and dizziness    Recommendations:  - orthostatic VS (negative)  -  CTA H/N 11/21; Patent intracranial circulation. No flow-limiting stenosis or occlusion. Patentcervical vasculature. No flow limiting stenosis or occlusion.  - cardiac workup per primary team if dizziness reoccurs.  - continue to follow up with vascular and rheumatology team  -MRI brain and orbits w/wo con: stable, no acute intracranial pathology. Unremarkable exam of the orbits.  - s/p temporal artery biopsy today, pain management per primary team  -steroid dosing as per rheumatology, appreciate recommendations. pt currently on prednisone 50mg PO daily  -continue PPI with steroids  -continue melatonin PRN for insomnia, and try to avoid steroid dosing near bedtime  - rest of care per primary team  - No further inpatient neurology recommendation, will sign off, please call consult service [phone] with any questions.  - Patient can follow up with general neurology at [address] [place] [address] 1-2 weeks after discharge by calling [phone] to schedule this appointment      Plans discussed with neurology attending, Dr. Pyle

## 2023-11-22 NOTE — PROGRESS NOTE ADULT - ATTENDING COMMENTS
78y F pmh htn, hld here for c/o persistent b/l temporal HA for weeks found to have elevated ESR/CRP concerning for GCA  pulse dose steroids completed x 3 days  on prednisone 50mg, taper by 10mg weekly. On pcp ppx  appreciate rheum, optho, vasc, neuro recs  mri orbits slight prominence of the vessels  s/p temporal atery biopsy  Dizzines improved- orthostatics neg, BP improved  PT eval- no needs    Dispo home today with steriods and outpatient rheum and pcp tod up    45 minutes spent    Tracy Frias MD  Division of Hospital Medicine  Available on [employer] Teams

## 2023-11-22 NOTE — MEDICAL STUDENT PROGRESS NOTE(EDUCATION) - ASSESSMENT
77 y/o female with PMH of HTN, HLD presenting with "weeks" of bilateral temporal headache worked-up for GCA, s/p temporal artery biospy.  79 y/o female with PMH of HTN, HLD presenting with "weeks" of bilateral temporal headache worked-up for GCA, s/p temporal artery biospy.  79 y/o female with PMH of HTN, HLD presenting with "weeks" of bilateral temporal headache and vision changes worked-up for GCA, s/p temporal artery biospy 11/20.     Problem/Plan - 1:  ·  Problem: GCA (giant cell arteritis).   ·  Plan: Weeks of temporal bilateral headache, slight blurred vision recently  -CRP of 19, ESR of 75  -Evaluated by ophthalmology and found not to have any disc edema or ophthalmologic manifestations of GCA on exam  -Rheum consulted, appreciate recs  -S/p L temporal artery biopsy 11/20  -Pt now with resolving headache, likely d/c today  -F/u ophtho, neurology, and rheum outpatient appointments     Plan:  -Methylprednisolone 1000mg x1 then 500mg x2 qd  -Currently on prednisone taper (50 mg prednisone)  -Pantoprazole 40mg daily for ppx on steroids  -Bactrim PCP prophylaxis  -Toridol for headache.     Problem/Plan - 2:  ·  Problem: Dizziness.   ·  Plan: Suspect in setting of being NPO 11/20  Orthostatics (-)  -Will continue to monitor for improvement.     Problem/Plan - 3:  ·  Problem: Glaucoma.   ·  Plan: IOP elevated on ophtho exam  -Latanoprost qhs to both eyes.     Problem/Plan - 4:  ·  Problem: Hypertension.   ·  Plan: On losartan 25mg qd  -Will continue.     Problem/Plan - 5:  ·  Problem: Hyperlipidemia.   ·  Plan: On crestor 10 qd  -Will continue atorvastatin 40mg qd.     Problem/Plan - 6:  ·  Problem: Macular degeneration.   ·  Plan: -Pt will need outpatient follow-up.     Problem/Plan - 8:  ·  Problem: Prophylactic measure.   ·  Plan: DVT proph: Lovenox  Diet: Regular  Activity: Normal  GOC: Full code.   77 y/o female with PMH of HTN, HLD presenting with "weeks" of bilateral temporal headache and vision changes worked-up for GCA, s/p temporal artery biospy 11/20.     Problem/Plan - 1:  ·  Problem: GCA (giant cell arteritis).   ·  Plan: Weeks of temporal bilateral headache, slight blurred vision recently  -CRP of 19, ESR of 75  -Evaluated by ophthalmology and found not to have any disc edema or ophthalmologic manifestations of GCA on exam  -Rheum consulted, appreciate recs  -S/p L temporal artery biopsy 11/20  -Pt now with resolving headache, likely d/c today  -F/u ophtho, neurology, and rheum outpatient appointments     Plan:  -Methylprednisolone 1000mg x1 then 500mg x2 qd  -Currently on prednisone taper (50 mg inpatient, 10mg taper every week up to 30mg till 12/15 appt with rheum Dr. Inder Ortiz)  -Pantoprazole 40mg daily for ppx on steroids  -Bactrim PCP prophylaxis  -Toridol for headache.     Problem/Plan - 2:  ·  Problem: Dizziness.   ·  Plan: Suspect in setting of being NPO 11/20  Orthostatics (-)  -Will continue to monitor for improvement.  -PT recs appreciated, no skilled PT needs     Problem/Plan - 3:  ·  Problem: Glaucoma.   ·  Plan: IOP elevated on ophtho exam  -Latanoprost qhs to both eyes.     Problem/Plan - 4:  ·  Problem: Hypertension.   ·  Plan: On losartan 25mg qd  -Will continue.     Problem/Plan - 5:  ·  Problem: Hyperlipidemia.   ·  Plan: On crestor 10 qd  -Will continue atorvastatin 40mg qd.     Problem/Plan - 6:  ·  Problem: Macular degeneration.   ·  Plan: -Pt will need outpatient follow-up.     Problem/Plan - 8:  ·  Problem: Prophylactic measure.   ·  Plan: DVT proph: Lovenox  Diet: Regular  Activity: Normal  GOC: Full code.   79 y/o female with PMH of HTN, HLD presenting with "weeks" of bilateral temporal headache and vision changes worked-up for GCA, s/p temporal artery biospy 11/20.     Problem/Plan - 1:  ·  Problem: GCA (giant cell arteritis).   ·  Plan: Weeks of temporal bilateral headache, slight blurred vision recently  -CRP of 19, ESR of 75  -Evaluated by ophthalmology and found not to have any disc edema or ophthalmologic manifestations of GCA on exam  -Rheum consulted, appreciate recs  -S/p L temporal artery biopsy 11/20  -Pt now with resolving headache, likely d/c today  -F/u ophtho, neurology, and rheum outpatient appointments     Plan:  -Methylprednisolone 1000mg x1 then 500mg x2 qd  -Currently on prednisone taper (50 mg inpatient, 10mg taper every week up to 30mg till 12/15 appt with rheum Dr. Inder Ortiz)  -Pantoprazole 40mg daily for ppx on steroids  -Bactrim PCP prophylaxis  -Toridol for headache.     Problem/Plan - 2:  ·  Problem: Dizziness.   ·  Plan: Suspect in setting of being NPO 11/20  Orthostatics (-)  -Will continue to monitor for improvement.  -PT recs appreciated, no skilled PT needs     Problem/Plan - 3:  ·  Problem: Glaucoma.   ·  Plan: IOP elevated on ophtho exam  -Latanoprost qhs to both eyes.     Problem/Plan - 4:  ·  Problem: Hypertension.   ·  Plan: On losartan 25mg qd  -Will continue.     Problem/Plan - 5:  ·  Problem: Hyperlipidemia.   ·  Plan: On crestor 10 qd  -Will continue atorvastatin 40mg qd.     Problem/Plan - 6:  ·  Problem: Macular degeneration.   ·  Plan: -Pt will need outpatient follow-up.     Problem/Plan - 8:  ·  Problem: Prophylactic measure.   ·  Plan: DVT proph: Lovenox  Diet: Regular  Activity: Normal  GOC: Full code.   77 y/o female with PMH of HTN, HLD presenting with "weeks" of bilateral temporal headache and vision changes worked-up for GCA, s/p temporal artery biospy 11/20.     Problem/Plan - 1:  ·  Problem: GCA (giant cell arteritis).   ·  Plan: Weeks of temporal bilateral headache, slight blurred vision recently  -CRP of 19, ESR of 75  -Evaluated by ophthalmology and found not to have any disc edema or ophthalmologic manifestations of GCA on exam  -Rheum consulted, appreciate recs  -S/p L temporal artery biopsy 11/20  -Pt now with resolving headache, likely d/c today  -F/u ophtho, neurology, and rheum outpatient appointments     Plan:  -Methylprednisolone 1000mg x1 then 500mg x2 qd  -Currently on prednisone taper (50 mg inpatient, 10mg taper every week up to 30mg till 12/15 appt with rheum Dr. Inder Ortiz)  -Pantoprazole 40mg daily for ppx on steroids  -Bactrim PCP prophylaxis  -s/p 1 dose of Toradol, ibuprofen PRN for pain     Problem/Plan - 2:  ·  Problem: Dizziness.   ·  Plan: Suspect in setting of being NPO 11/20  Orthostatics (-)  -Will continue to monitor for improvement.  -PT recs appreciated, no skilled PT needs     Problem/Plan - 3:  ·  Problem: Glaucoma.   ·  Plan: IOP elevated on ophtho exam  -Latanoprost qhs to both eyes.     Problem/Plan - 4:  ·  Problem: Hypertension.   ·  Plan: On losartan 25mg qd  -Will continue.     Problem/Plan - 5:  ·  Problem: Hyperlipidemia.   ·  Plan: On crestor 10 qd  -Will continue atorvastatin 40mg qd.     Problem/Plan - 6:  ·  Problem: Macular degeneration.   ·  Plan: -Pt will need outpatient follow-up.     Problem/Plan - 8:  ·  Problem: Prophylactic measure.   ·  Plan: DVT proph: Lovenox  Diet: Regular  Activity: Normal  GOC: Full code.   79 y/o female with PMH of HTN, HLD presenting with "weeks" of bilateral temporal headache and vision changes worked-up for GCA, s/p temporal artery biospy 11/20.     Problem/Plan - 1:  ·  Problem: GCA (giant cell arteritis).   ·  Plan: Weeks of temporal bilateral headache, slight blurred vision recently  -CRP of 19, ESR of 75  -Evaluated by ophthalmology and found not to have any disc edema or ophthalmologic manifestations of GCA on exam  -Rheum consulted, appreciate recs  -S/p L temporal artery biopsy 11/20  -Pt now with resolving headache, likely d/c today  -F/u ophtho, neurology, and rheum outpatient appointments     Plan:  -Methylprednisolone 1000mg x1 then 500mg x2 qd  -Currently on prednisone taper (50 mg inpatient, 10mg taper every week up to 30mg till 12/15 appt with rheum Dr. Inder Ortiz)  -Pantoprazole 40mg daily for ppx on steroids  -Bactrim PCP prophylaxis  -s/p 1 dose of Toradol, ibuprofen PRN for pain     Problem/Plan - 2:  ·  Problem: Dizziness.   ·  Plan: Suspect in setting of being NPO 11/20  Orthostatics (-)  -Will continue to monitor for improvement.  -PT recs appreciated, no skilled PT needs     Problem/Plan - 3:  ·  Problem: Glaucoma.   ·  Plan: IOP elevated on ophtho exam  -Latanoprost qhs to both eyes.     Problem/Plan - 4:  ·  Problem: Hypertension.   ·  Plan: On losartan 25mg qd  -Will continue.     Problem/Plan - 5:  ·  Problem: Hyperlipidemia.   ·  Plan: On crestor 10 qd  -Will continue atorvastatin 40mg qd.     Problem/Plan - 6:  ·  Problem: Macular degeneration.   ·  Plan: -Pt will need outpatient follow-up.     Problem/Plan - 8:  ·  Problem: Prophylactic measure.   ·  Plan: DVT proph: Lovenox  Diet: Regular  Activity: Normal  GOC: Full code.

## 2023-11-22 NOTE — DISCHARGE NOTE NURSING/CASE MANAGEMENT/SOCIAL WORK - PATIENT PORTAL LINK FT
You can access the FollowMyHealth Patient Portal offered by [facility] by registering at the following website: [email or web address]. By joining [employer]’s FollowMyHealth portal, you will also be able to view your health information using other applications (apps) compatible with our system.

## 2023-11-22 NOTE — DISCHARGE NOTE NURSING/CASE MANAGEMENT/SOCIAL WORK - NSDCFUADDAPPT_GEN_ALL_CORE_FT
APPTS ARE READY TO BE MADE: [x ] YES    Best Family or Patient Contact (if needed): Daughter Loyda    Additional Information about above appointments (if needed):    1: [facility] - Dr. Any Ortiz 12/15 @1pm  2: Your ophthalmologist- Dr. Ignacio Ross 11/21 @11:30 am   3: Your neurologist Dr Fallon ([facility])    Other comments or requests:

## 2023-11-22 NOTE — PROGRESS NOTE ADULT - PROVIDER SPECIALTY LIST ADULT
Neurology
Vascular Surgery
Heme/Onc
Internal Medicine
Neurology
Vascular Surgery
Neurology
Ophthalmology
Internal Medicine

## 2023-11-22 NOTE — PROGRESS NOTE ADULT - SUBJECTIVE AND OBJECTIVE BOX
Patient is a 78y old  Female who presents with a chief complaint of Headaches (22 Nov 2023 10:47)    No acute overnight events.  Patient resting in bed, reports ongoing HA    MEDICATIONS  (STANDING):  atorvastatin 40 milliGRAM(s) Oral at bedtime  enoxaparin Injectable 40 milliGRAM(s) SubCutaneous every 24 hours  latanoprost 0.005% Ophthalmic Solution 1 Drop(s) Both EYES at bedtime  losartan 25 milliGRAM(s) Oral daily  pantoprazole    Tablet 40 milliGRAM(s) Oral before breakfast  predniSONE   Tablet 50 milliGRAM(s) Oral daily  sodium chloride 0.65% Nasal 1 Spray(s) Both Nostrils two times a day  trimethoprim  160 mG/sulfamethoxazole 800 mG 1 Tablet(s) Oral <User Schedule>    MEDICATIONS  (PRN):  acetaminophen     Tablet .. 650 milliGRAM(s) Oral every 6 hours PRN Temp greater or equal to 38C (100.4F), Mild Pain (1 - 3)  aluminum hydroxide/magnesium hydroxide/simethicone Suspension 30 milliLiter(s) Oral every 4 hours PRN Dyspepsia  ibuprofen  Tablet. 400 milliGRAM(s) Oral every 6 hours PRN Moderate Pain (4 - 6)  melatonin 3 milliGRAM(s) Oral at bedtime PRN Insomnia  ondansetron Injectable 4 milliGRAM(s) IV Push every 8 hours PRN Nausea and/or Vomiting      Vital Signs Last 24 Hrs  T(C): 36.8 (22 Nov 2023 04:46), Max: 37.2 (21 Nov 2023 12:20)  T(F): 98.3 (22 Nov 2023 04:46), Max: 98.9 (21 Nov 2023 12:20)  HR: 71 (22 Nov 2023 04:46) (65 - 72)  BP: 108/64 (22 Nov 2023 04:46) (108/64 - 161/74)  BP(mean): --  RR: 18 (22 Nov 2023 04:46) (18 - 18)  SpO2: 99% (22 Nov 2023 04:46) (98% - 100%)    Parameters below as of 22 Nov 2023 04:46  Patient On (Oxygen Delivery Method): room air        PE  NAD  Awake, alert  Anicteric, MMM  RRR  CTAB  Abd soft, NT, ND  No c/c/e  No rash grossly  FROM                          10.6   6.11  )-----------( 310      ( 22 Nov 2023 07:25 )             32.3       11-22    140  |  104  |  29<H>  ----------------------------<  79  4.0   |  25  |  0.94    Ca    8.8      22 Nov 2023 07:22  Phos  2.1     11-22  Mg     2.2     11-22

## 2023-11-22 NOTE — PROGRESS NOTE ADULT - ASSESSMENT
77 y/o female with PMH of HTN, HLD presenting with "weeks" of bilateral temporal headache.    Anemia  --under the care of Dr Dos Santos of [facility]  --outpatient lab work w/o evidence of hemolysis and/or nutritional deficiencies No monoclonal protein on SPEP or serum immunofixation and unremarkable serum free light  chains. No abnormal immunophenotype noted on flow cytometry.  --last endoscopy and colonoscopy (done 2020) w/o signs of GIB  --imaging of the abdomen and pelvis ( done outpatient 4/23) w/o suspected neoplasm and/or bleed   --anemia likely 2/2 inflammation  --Hgb ~10 at baseline    headaches  --treated outpatient w/ prednisone 20 for temporal arteritis  --rheumatology consulted  --evaluated by ophthalmology and found not to have any disc edema or ophthalmologic manifestations of GCA on exam  --MRI brain and orbits w/wo con: stable, no acute intracranial pathology. Unremarkable exam of the orbits.  --s/p temporal artery biopsy 11/21, pain management per primary team  --management per primary team    will follow    Elo Covarrubias NP  Hematology/ Oncology  [facility]  [phone] (office)  [phone] (alt office)  Evenings and weekends please call MD on call or office

## 2023-11-22 NOTE — PROGRESS NOTE ADULT - PROBLEM SELECTOR PLAN 8
DVT proph: Lovenox  Diet: Regular  Activity: Normal  GOC: Full code
DVT proph: Lovenox  Diet: Regular  Activity: Normal  GOC: Full code

## 2023-11-22 NOTE — PROGRESS NOTE ADULT - REASON FOR ADMISSION
Headaches

## 2023-11-22 NOTE — PROGRESS NOTE ADULT - ASSESSMENT
77 y/o female with PMH of HTN, HLD presenting with sub-acute headache admitted for concern for GCA s/p temporal artery biopsy 11/21. Continuing prednisone therapy.

## 2023-11-22 NOTE — MEDICAL STUDENT PROGRESS NOTE(EDUCATION) - SUBJECTIVE AND OBJECTIVE BOX
Medical Student Note    Patient Information:  PATTI HARRIS / 78y / Female / MRN#:[id number]    Hospital Day: 6d    Interval History:  Patient seen and examined at bedside. Patient reports to be doing well overnight with resolved dizziness/weakness. She reports decreased headache pain.       Past Medical History:    Past Surgical History:    Allergies:  No Known Allergies    Medications:  PRN:  acetaminophen     Tablet .. 650 milliGRAM(s) Oral every 6 hours PRN Temp greater or equal to 38C (100.4F), Mild Pain (1 - 3)  aluminum hydroxide/magnesium hydroxide/simethicone Suspension 30 milliLiter(s) Oral every 4 hours PRN Dyspepsia  ibuprofen  Tablet. 400 milliGRAM(s) Oral every 6 hours PRN Moderate Pain (4 - 6)  melatonin 3 milliGRAM(s) Oral at bedtime PRN Insomnia  ondansetron Injectable 4 milliGRAM(s) IV Push every 8 hours PRN Nausea and/or Vomiting    Standing:  atorvastatin 40 milliGRAM(s) Oral at bedtime  enoxaparin Injectable 40 milliGRAM(s) SubCutaneous every 24 hours  latanoprost 0.005% Ophthalmic Solution 1 Drop(s) Both EYES at bedtime  losartan 25 milliGRAM(s) Oral daily  pantoprazole    Tablet 40 milliGRAM(s) Oral before breakfast  predniSONE   Tablet 50 milliGRAM(s) Oral daily  sodium chloride 0.65% Nasal 1 Spray(s) Both Nostrils two times a day  trimethoprim  160 mG/sulfamethoxazole 800 mG 1 Tablet(s) Oral <User Schedule>    Home:  Crestor 10 mg oral tablet: 1 tab(s) orally once a day  losartan 25 mg oral tablet: 1 tab(s) orally once a day    Vitals:  T(C): 36.8, Max: 37.2 (11-21-23 @ 12:20)  T(F): 98.3, Max: 98.9 (11-21-23 @ 12:20)  HR: 71 (65 - 72)  BP: 108/64 (108/64 - 161/74)  RR: 18 (18 - 18)  SpO2: 99% (97% - 100%)    Physical Exam:  General: Awake, Alert. Not in acute distress.  Head: Normocephalic atraumatic.  Neck: Supple  Heart: Regular rate and rhythm; S1, S2; No murmurs.  Lungs: Clear to auscultation bilaterally.  Abdomen: Soft, nontender, nondistended.  Extremities: No edema in upper or lower extremities.  Neuro: AAOx3, No focal deficits.    Labs:  CBC (11-22 @ 07:25)                        Hgb: 10.6   WBC: 6.11  )-----------------( Plts: 310                              Hct: 32.3     Chem (11-22 @ 07:22)  Na: 140  |     Cl: 104     |  BUN: 29  -----------------------------------------< Gluc: 79    K: 4.0   |    HCO3: 25  |  Cr: 0.94    Ca 8.8 (11-22 @ 07:22)  Phos 2.1 (11-22 @ 07:22)  Mg 2.2 (11-22 @ 07:22)             Medical Student Note    Patient Information:  PATTI HARRIS / 78y / Female / MRN#:[id number]    Hospital Day: 6d    Interval History:  Patient seen and examined at bedside. Patient reports to be doing well overnight with resolved dizziness/weakness. She reports decreased headache pain.       Past Medical History:    Past Surgical History:    Allergies:  No Known Allergies    Medications:  PRN:  acetaminophen     Tablet .. 650 milliGRAM(s) Oral every 6 hours PRN Temp greater or equal to 38C (100.4F), Mild Pain (1 - 3)  aluminum hydroxide/magnesium hydroxide/simethicone Suspension 30 milliLiter(s) Oral every 4 hours PRN Dyspepsia  ibuprofen  Tablet. 400 milliGRAM(s) Oral every 6 hours PRN Moderate Pain (4 - 6)  melatonin 3 milliGRAM(s) Oral at bedtime PRN Insomnia  ondansetron Injectable 4 milliGRAM(s) IV Push every 8 hours PRN Nausea and/or Vomiting    Standing:  atorvastatin 40 milliGRAM(s) Oral at bedtime  enoxaparin Injectable 40 milliGRAM(s) SubCutaneous every 24 hours  latanoprost 0.005% Ophthalmic Solution 1 Drop(s) Both EYES at bedtime  losartan 25 milliGRAM(s) Oral daily  pantoprazole    Tablet 40 milliGRAM(s) Oral before breakfast  predniSONE   Tablet 50 milliGRAM(s) Oral daily  sodium chloride 0.65% Nasal 1 Spray(s) Both Nostrils two times a day  trimethoprim  160 mG/sulfamethoxazole 800 mG 1 Tablet(s) Oral <User Schedule>    Home:  Crestor 10 mg oral tablet: 1 tab(s) orally once a day  losartan 25 mg oral tablet: 1 tab(s) orally once a day    Vitals:  T(C): 36.8, Max: 37.2 (11-21-23 @ 12:20)  T(F): 98.3, Max: 98.9 (11-21-23 @ 12:20)  HR: 71 (65 - 72)  BP: 108/64 (108/64 - 161/74)  RR: 18 (18 - 18)  SpO2: 99% (97% - 100%)    Physical Exam:  General: Awake, Alert. Not in acute distress.  Head: Normocephalic atraumatic.  Neck: Supple  Heart: Regular rate and rhythm; S1, S2; No murmurs.  Lungs: Clear to auscultation bilaterally.  Abdomen: Soft, nontender, nondistended.  Extremities: No edema in upper or lower extremities.  Neuro: AAOx3, No focal deficits.    Labs:  CBC (11-22 @ 07:25)                        Hgb: 10.6   WBC: 6.11  )-----------------( Plts: 310                              Hct: 32.3     Chem (11-22 @ 07:22)  Na: 140  |     Cl: 104     |  BUN: 29  -----------------------------------------< Gluc: 79    K: 4.0   |    HCO3: 25  |  Cr: 0.94    Ca 8.8 (11-22 @ 07:22)  Phos 2.1 (11-22 @ 07:22)  Mg 2.2 (11-22 @ 07:22)             Medical Student Note    Patient Information:  PATTI HARRIS / 78y / Female / MRN#:[id number]    Hospital Day: 6d    Interval History:  Patient seen and examined at bedside. Patient reports to be doing well overnight with resolved dizziness/weakness. She reports decreased headache pain.       Past Medical History:    Past Surgical History:    Allergies:  No Known Allergies    Medications:  PRN:  acetaminophen     Tablet .. 650 milliGRAM(s) Oral every 6 hours PRN Temp greater or equal to 38C (100.4F), Mild Pain (1 - 3)  aluminum hydroxide/magnesium hydroxide/simethicone Suspension 30 milliLiter(s) Oral every 4 hours PRN Dyspepsia  ibuprofen  Tablet. 400 milliGRAM(s) Oral every 6 hours PRN Moderate Pain (4 - 6)  melatonin 3 milliGRAM(s) Oral at bedtime PRN Insomnia  ondansetron Injectable 4 milliGRAM(s) IV Push every 8 hours PRN Nausea and/or Vomiting    Standing:  atorvastatin 40 milliGRAM(s) Oral at bedtime  enoxaparin Injectable 40 milliGRAM(s) SubCutaneous every 24 hours  latanoprost 0.005% Ophthalmic Solution 1 Drop(s) Both EYES at bedtime  losartan 25 milliGRAM(s) Oral daily  pantoprazole    Tablet 40 milliGRAM(s) Oral before breakfast  predniSONE   Tablet 50 milliGRAM(s) Oral daily  sodium chloride 0.65% Nasal 1 Spray(s) Both Nostrils two times a day  trimethoprim  160 mG/sulfamethoxazole 800 mG 1 Tablet(s) Oral <User Schedule>    Home:  Crestor 10 mg oral tablet: 1 tab(s) orally once a day  losartan 25 mg oral tablet: 1 tab(s) orally once a day    Vitals:  T(C): 36.8, Max: 37.2 (11-21-23 @ 12:20)  T(F): 98.3, Max: 98.9 (11-21-23 @ 12:20)  HR: 71 (65 - 72)  BP: 108/64 (108/64 - 161/74)  RR: 18 (18 - 18)  SpO2: 99% (97% - 100%)    Physical Exam:  General: Awake, Alert. Not in acute distress.  Head: Normocephalic atraumatic.  Neck: Supple  Heart: Regular rate and rhythm; S1, S2; No murmurs.  Lungs: Clear to auscultation bilaterally.  Abdomen: Soft, nontender, nondistended.  Extremities: No edema in upper or lower extremities.  Neuro: AAOx3, No focal deficits.    Labs:  CBC (11-22 @ 07:25)                        Hgb: 10.6   WBC: 6.11  )-----------------( Plts: 310                              Hct: 32.3     Chem (11-22 @ 07:22)  Na: 140  |     Cl: 104     |  BUN: 29  -----------------------------------------< Gluc: 79    K: 4.0   |    HCO3: 25  |  Cr: 0.94    Ca 8.8 (11-22 @ 07:22)  Phos 2.1 (11-22 @ 07:22)  Mg 2.2 (11-22 @ 07:22)             Medical Student Note    Patient Information:  PATTI HARRIS / 78y / Female / MRN#:[id number]    Hospital Day: 6d    Interval History:  Patient seen and examined at bedside. Patient reports to be doing well overnight with resolved dizziness/weakness. She reports decreased headache pain.     Past Medical History:  Cervical Cancer  Hiatal Hernia   GERD    Past Surgical History:  Hysterectomy     Allergies:  No Known Allergies      Allergies:  No Known Allergies    Medications:  PRN:  acetaminophen     Tablet .. 650 milliGRAM(s) Oral every 6 hours PRN Temp greater or equal to 38C (100.4F), Mild Pain (1 - 3)  aluminum hydroxide/magnesium hydroxide/simethicone Suspension 30 milliLiter(s) Oral every 4 hours PRN Dyspepsia  ibuprofen  Tablet. 400 milliGRAM(s) Oral every 6 hours PRN Moderate Pain (4 - 6)  melatonin 3 milliGRAM(s) Oral at bedtime PRN Insomnia  ondansetron Injectable 4 milliGRAM(s) IV Push every 8 hours PRN Nausea and/or Vomiting    Standing:  atorvastatin 40 milliGRAM(s) Oral at bedtime  enoxaparin Injectable 40 milliGRAM(s) SubCutaneous every 24 hours  latanoprost 0.005% Ophthalmic Solution 1 Drop(s) Both EYES at bedtime  losartan 25 milliGRAM(s) Oral daily  pantoprazole    Tablet 40 milliGRAM(s) Oral before breakfast  predniSONE   Tablet 50 milliGRAM(s) Oral daily  sodium chloride 0.65% Nasal 1 Spray(s) Both Nostrils two times a day  trimethoprim  160 mG/sulfamethoxazole 800 mG 1 Tablet(s) Oral <User Schedule>    Home:  Crestor 10 mg oral tablet: 1 tab(s) orally once a day  losartan 25 mg oral tablet: 1 tab(s) orally once a day    Vitals:  T(C): 36.8, Max: 37.2 (11-21-23 @ 12:20)  T(F): 98.3, Max: 98.9 (11-21-23 @ 12:20)  HR: 71 (65 - 72)  BP: 108/64 (108/64 - 161/74)  RR: 18 (18 - 18)  SpO2: 99% (97% - 100%)    Physical Exam:  General: Awake, Alert. Not in acute distress.  Head: Normocephalic atraumatic.  Neck: Supple  Heart: Regular rate and rhythm; S1, S2; No murmurs.  Lungs: Clear to auscultation bilaterally.  Abdomen: Soft, nontender, nondistended.  Extremities: No edema in upper or lower extremities.  Neuro: AAOx3, No focal deficits.    Labs:  CBC (11-22 @ 07:25)                        Hgb: 10.6   WBC: 6.11  )-----------------( Plts: 310                              Hct: 32.3     Chem (11-22 @ 07:22)  Na: 140  |     Cl: 104     |  BUN: 29  -----------------------------------------< Gluc: 79    K: 4.0   |    HCO3: 25  |  Cr: 0.94    Ca 8.8 (11-22 @ 07:22)  Phos 2.1 (11-22 @ 07:22)  Mg 2.2 (11-22 @ 07:22)             Medical Student Note    Patient Information:  PATTI HARRIS / 78y / Female / MRN#:[id number]    Hospital Day: 6d    Interval History:  Patient seen and examined at bedside. Patient reports to be doing well overnight with resolved dizziness/weakness. She reports decreased headache pain.     Past Medical History:  Cervical Cancer  Hiatal Hernia   GERD    Past Surgical History:  Hysterectomy     Allergies:  No Known Allergies      Allergies:  No Known Allergies    Medications:  PRN:  acetaminophen     Tablet .. 650 milliGRAM(s) Oral every 6 hours PRN Temp greater or equal to 38C (100.4F), Mild Pain (1 - 3)  aluminum hydroxide/magnesium hydroxide/simethicone Suspension 30 milliLiter(s) Oral every 4 hours PRN Dyspepsia  ibuprofen  Tablet. 400 milliGRAM(s) Oral every 6 hours PRN Moderate Pain (4 - 6)  melatonin 3 milliGRAM(s) Oral at bedtime PRN Insomnia  ondansetron Injectable 4 milliGRAM(s) IV Push every 8 hours PRN Nausea and/or Vomiting    Standing:  atorvastatin 40 milliGRAM(s) Oral at bedtime  enoxaparin Injectable 40 milliGRAM(s) SubCutaneous every 24 hours  latanoprost 0.005% Ophthalmic Solution 1 Drop(s) Both EYES at bedtime  losartan 25 milliGRAM(s) Oral daily  pantoprazole    Tablet 40 milliGRAM(s) Oral before breakfast  predniSONE   Tablet 50 milliGRAM(s) Oral daily  sodium chloride 0.65% Nasal 1 Spray(s) Both Nostrils two times a day  trimethoprim  160 mG/sulfamethoxazole 800 mG 1 Tablet(s) Oral <User Schedule>    Home:  Crestor 10 mg oral tablet: 1 tab(s) orally once a day  losartan 25 mg oral tablet: 1 tab(s) orally once a day    Vitals:  T(C): 36.8, Max: 37.2 (11-21-23 @ 12:20)  T(F): 98.3, Max: 98.9 (11-21-23 @ 12:20)  HR: 71 (65 - 72)  BP: 108/64 (108/64 - 161/74)  RR: 18 (18 - 18)  SpO2: 99% (97% - 100%)    Physical Exam:  General: Awake, Alert. Not in acute distress.  Head: Normocephalic atraumatic.  Neck: Supple  Heart: Regular rate and rhythm; S1, S2; No murmurs.  Lungs: Clear to auscultation bilaterally.  Abdomen: Soft, nontender, nondistended.  Extremities: No edema in upper or lower extremities.  Neuro: AAOx3, No focal deficits.    Labs:  CBC (11-22 @ 07:25)                        Hgb: 10.6   WBC: 6.11  )-----------------( Plts: 310                              Hct: 32.3     Chem (11-22 @ 07:22)  Na: 140  |     Cl: 104     |  BUN: 29  -----------------------------------------< Gluc: 79    K: 4.0   |    HCO3: 25  |  Cr: 0.94    Ca 8.8 (11-22 @ 07:22)  Phos 2.1 (11-22 @ 07:22)  Mg 2.2 (11-22 @ 07:22)             Medical Student Note    Patient Information:  PATTI HARRIS / 78y / Female / MRN#:[id number]    Hospital Day: 6d    Interval History:  Patient seen and examined at bedside. Patient reports to be doing well overnight with resolved dizziness/weakness. She reports decreased headache pain.     Past Medical History:  Cervical Cancer  Hiatal Hernia   GERD    Past Surgical History:  Hysterectomy     Allergies:  No Known Allergies      Allergies:  No Known Allergies    Medications:  PRN:  acetaminophen     Tablet .. 650 milliGRAM(s) Oral every 6 hours PRN Temp greater or equal to 38C (100.4F), Mild Pain (1 - 3)  aluminum hydroxide/magnesium hydroxide/simethicone Suspension 30 milliLiter(s) Oral every 4 hours PRN Dyspepsia  ibuprofen  Tablet. 400 milliGRAM(s) Oral every 6 hours PRN Moderate Pain (4 - 6)  melatonin 3 milliGRAM(s) Oral at bedtime PRN Insomnia  ondansetron Injectable 4 milliGRAM(s) IV Push every 8 hours PRN Nausea and/or Vomiting    Standing:  atorvastatin 40 milliGRAM(s) Oral at bedtime  enoxaparin Injectable 40 milliGRAM(s) SubCutaneous every 24 hours  latanoprost 0.005% Ophthalmic Solution 1 Drop(s) Both EYES at bedtime  losartan 25 milliGRAM(s) Oral daily  pantoprazole    Tablet 40 milliGRAM(s) Oral before breakfast  predniSONE   Tablet 50 milliGRAM(s) Oral daily  sodium chloride 0.65% Nasal 1 Spray(s) Both Nostrils two times a day  trimethoprim  160 mG/sulfamethoxazole 800 mG 1 Tablet(s) Oral <User Schedule>    Home:  Crestor 10 mg oral tablet: 1 tab(s) orally once a day  losartan 25 mg oral tablet: 1 tab(s) orally once a day    Vitals:  T(C): 36.8, Max: 37.2 (11-21-23 @ 12:20)  T(F): 98.3, Max: 98.9 (11-21-23 @ 12:20)  HR: 71 (65 - 72)  BP: 108/64 (108/64 - 161/74)  RR: 18 (18 - 18)  SpO2: 99% (97% - 100%)    Physical Exam:  General: Awake, Alert. Not in acute distress.  Head: Normocephalic atraumatic.  Neck: Supple  Heart: Regular rate and rhythm; S1, S2; No murmurs.  Lungs: Clear to auscultation bilaterally.  Abdomen: Soft, nontender, nondistended.  Extremities: No edema in upper or lower extremities.  Neuro: AAOx3, No focal deficits.    Labs:  CBC (11-22 @ 07:25)                        Hgb: 10.6   WBC: 6.11  )-----------------( Plts: 310                              Hct: 32.3     Chem (11-22 @ 07:22)  Na: 140  |     Cl: 104     |  BUN: 29  -----------------------------------------< Gluc: 79    K: 4.0   |    HCO3: 25  |  Cr: 0.94    Ca 8.8 (11-22 @ 07:22)  Phos 2.1 (11-22 @ 07:22)  Mg 2.2 (11-22 @ 07:22)

## 2023-11-22 NOTE — DISCHARGE NOTE NURSING/CASE MANAGEMENT/SOCIAL WORK - NSDCPEFALRISK_GEN_ALL_CORE
For information on Fall & Injury Prevention, visit: [email or web address]  [email or web address] OR  https://www.cdc.gov/steadi/patient.html

## 2023-11-22 NOTE — PROGRESS NOTE ADULT - PROBLEM SELECTOR PROBLEM 1
GCA (giant cell arteritis)

## 2023-11-22 NOTE — PROGRESS NOTE ADULT - PROBLEM SELECTOR PLAN 1
Weeks of temporal bilateral headache, slight blurred vision recently  CRP of 19, ESR of 75  Evaluated by ophthalmology and found not to have any disc edema or ophthalmologic manifestations of GCA on exam  Rheum consulted, appreciate recs  S/p L temporal artery biopsy 11/20  Now with headache  Plan:  -Methylprednisolone 1000mg x1 then 500mg x2 qd  -Currently on prednisone taper  -Pantoprazole 40mg daily for ppx on steroids  -Bactrim PCP prophylaxis  -Ibuprofen for headache

## 2023-11-22 NOTE — PROGRESS NOTE ADULT - SUBJECTIVE AND OBJECTIVE BOX
PROGRESS NOTE:   Authored by Oneyda Paris MD PGY-1  Internal Medicine      Patient is a 78y old  Female who presents with a chief complaint of Headaches (21 Nov 2023 11:21)      SUBJECTIVE / OVERNIGHT EVENTS: ***, patient seen and examined at bedside    MEDICATIONS  (STANDING):  atorvastatin 40 milliGRAM(s) Oral at bedtime  enoxaparin Injectable 40 milliGRAM(s) SubCutaneous every 24 hours  latanoprost 0.005% Ophthalmic Solution 1 Drop(s) Both EYES at bedtime  losartan 25 milliGRAM(s) Oral daily  pantoprazole    Tablet 40 milliGRAM(s) Oral before breakfast  predniSONE   Tablet 50 milliGRAM(s) Oral daily  sodium chloride 0.65% Nasal 1 Spray(s) Both Nostrils two times a day  trimethoprim  160 mG/sulfamethoxazole 800 mG 1 Tablet(s) Oral <User Schedule>    MEDICATIONS  (PRN):  acetaminophen     Tablet .. 650 milliGRAM(s) Oral every 6 hours PRN Temp greater or equal to 38C (100.4F), Mild Pain (1 - 3)  aluminum hydroxide/magnesium hydroxide/simethicone Suspension 30 milliLiter(s) Oral every 4 hours PRN Dyspepsia  ibuprofen  Tablet. 400 milliGRAM(s) Oral every 6 hours PRN Moderate Pain (4 - 6)  melatonin 3 milliGRAM(s) Oral at bedtime PRN Insomnia  ondansetron Injectable 4 milliGRAM(s) IV Push every 8 hours PRN Nausea and/or Vomiting      CAPILLARY BLOOD GLUCOSE        I&O's Summary    21 Nov 2023 07:01  -  22 Nov 2023 07:00  --------------------------------------------------------  IN: 720 mL / OUT: 0 mL / NET: 720 mL        PHYSICAL EXAM:  Vital Signs Last 24 Hrs  T(C): 36.8 (22 Nov 2023 04:46), Max: 37.2 (21 Nov 2023 12:20)  T(F): 98.3 (22 Nov 2023 04:46), Max: 98.9 (21 Nov 2023 12:20)  HR: 71 (22 Nov 2023 04:46) (65 - 81)  BP: 108/64 (22 Nov 2023 04:46) (108/64 - 161/74)  RR: 18 (22 Nov 2023 04:46) (18 - 18)  SpO2: 99% (22 Nov 2023 04:46) (97% - 100%)    Parameters below as of 22 Nov 2023 04:46  Patient On (Oxygen Delivery Method): room air      CONSTITUTIONAL: Well-groomed, in no apparent distress  RESPIRATORY: Breathing comfortably; no dullness to percussion; lungs CTA without wheeze/rhonchi/rales  CARDIOVASCULAR: +S1S2, RRR, no M/G/R; pedal pulses full and symmetric; no lower extremity edema  GASTROINTESTINAL: No palpable masses or tenderness, +BS throughout, no rebound/guarding; no hepatosplenomegaly; no hernia palpated  SKIN: No rashes or ulcers noted  NEUROLOGIC: A+O x 3, CN II-XII intact; sensation intact in LEs b/l to light touch    LABS: PROGRESS NOTE:   Authored by Oneyda Paris MD PGY-1  Internal Medicine      Patient is a 78y old  Female who presents with a chief complaint of Headaches (21 Nov 2023 11:21)      SUBJECTIVE / OVERNIGHT EVENTS: NAEO. Pt notes her dizziness has resolved. Endorses mild posterior headache. Seen and examined at bedside.    MEDICATIONS  (STANDING):  atorvastatin 40 milliGRAM(s) Oral at bedtime  enoxaparin Injectable 40 milliGRAM(s) SubCutaneous every 24 hours  latanoprost 0.005% Ophthalmic Solution 1 Drop(s) Both EYES at bedtime  losartan 25 milliGRAM(s) Oral daily  pantoprazole    Tablet 40 milliGRAM(s) Oral before breakfast  predniSONE   Tablet 50 milliGRAM(s) Oral daily  sodium chloride 0.65% Nasal 1 Spray(s) Both Nostrils two times a day  trimethoprim  160 mG/sulfamethoxazole 800 mG 1 Tablet(s) Oral <User Schedule>    MEDICATIONS  (PRN):  acetaminophen     Tablet .. 650 milliGRAM(s) Oral every 6 hours PRN Temp greater or equal to 38C (100.4F), Mild Pain (1 - 3)  aluminum hydroxide/magnesium hydroxide/simethicone Suspension 30 milliLiter(s) Oral every 4 hours PRN Dyspepsia  ibuprofen  Tablet. 400 milliGRAM(s) Oral every 6 hours PRN Moderate Pain (4 - 6)  melatonin 3 milliGRAM(s) Oral at bedtime PRN Insomnia  ondansetron Injectable 4 milliGRAM(s) IV Push every 8 hours PRN Nausea and/or Vomiting      CAPILLARY BLOOD GLUCOSE        I&O's Summary    21 Nov 2023 07:01  -  22 Nov 2023 07:00  --------------------------------------------------------  IN: 720 mL / OUT: 0 mL / NET: 720 mL        PHYSICAL EXAM:  Vital Signs Last 24 Hrs  T(C): 36.8 (22 Nov 2023 04:46), Max: 37.2 (21 Nov 2023 12:20)  T(F): 98.3 (22 Nov 2023 04:46), Max: 98.9 (21 Nov 2023 12:20)  HR: 71 (22 Nov 2023 04:46) (65 - 81)  BP: 108/64 (22 Nov 2023 04:46) (108/64 - 161/74)  RR: 18 (22 Nov 2023 04:46) (18 - 18)  SpO2: 99% (22 Nov 2023 04:46) (97% - 100%)    Parameters below as of 22 Nov 2023 04:46  Patient On (Oxygen Delivery Method): room air      CONSTITUTIONAL: Well-groomed, in no apparent distress  RESPIRATORY: Breathing comfortably; no dullness to percussion; lungs CTA without wheeze/rhonchi/rales  CARDIOVASCULAR: +S1S2, RRR, no M/G/R; pedal pulses full and symmetric; no lower extremity edema  GASTROINTESTINAL: No palpable masses or tenderness, +BS throughout, no rebound/guarding; no hepatosplenomegaly; no hernia palpated  SKIN: No rashes or ulcers noted  NEUROLOGIC: A+O x 3, CN II-XII intact; sensation intact in LEs b/l to light touch      LABS:                          10.6   6.11  )-----------( 310      ( 22 Nov 2023 07:25 )             32.3     11-22    140  |  104  |  29<H>  ----------------------------<  79  4.0   |  25  |  0.94    Ca    8.8      22 Nov 2023 07:22  Phos  2.1     11-22  Mg     2.2     11-22

## 2023-12-13 ENCOUNTER — TRANSCRIPTION ENCOUNTER (OUTPATIENT)
Age: 78
End: 2023-12-13

## 2023-12-15 ENCOUNTER — APPOINTMENT (OUTPATIENT)
Dept: RHEUMATOLOGY | Facility: CLINIC | Age: 78
End: 2023-12-15
Payer: MEDICARE

## 2023-12-15 VITALS
RESPIRATION RATE: 16 BRPM | OXYGEN SATURATION: 98 % | DIASTOLIC BLOOD PRESSURE: 88 MMHG | TEMPERATURE: 97.1 F | WEIGHT: 114 LBS | SYSTOLIC BLOOD PRESSURE: 125 MMHG | HEART RATE: 79 BPM

## 2023-12-15 VITALS — HEIGHT: 63 IN | BODY MASS INDEX: 20.19 KG/M2

## 2023-12-15 DIAGNOSIS — R51.9 HEADACHE, UNSPECIFIED: ICD-10-CM

## 2023-12-15 DIAGNOSIS — Z79.899 OTHER LONG TERM (CURRENT) DRUG THERAPY: ICD-10-CM

## 2023-12-15 DIAGNOSIS — D64.9 ANEMIA, UNSPECIFIED: ICD-10-CM

## 2023-12-15 PROCEDURE — 99215 OFFICE O/P EST HI 40 MIN: CPT

## 2023-12-20 PROBLEM — D64.9 ANEMIA: Status: ACTIVE | Noted: 2023-12-20

## 2023-12-20 PROBLEM — R51.9 FREQUENT HEADACHES: Status: ACTIVE | Noted: 2023-12-20

## 2023-12-20 NOTE — PHYSICAL EXAM
[General Appearance - Alert] : alert [General Appearance - In No Acute Distress] : in no acute distress [Sclera] : the sclera and conjunctiva were normal [Auscultation Breath Sounds / Voice Sounds] : lungs were clear to auscultation bilaterally [Heart Sounds] : normal S1 and S2 [Musculoskeletal - Swelling] : no joint swelling seen [Impaired Insight] : insight and judgment were intact [FreeTextEntry1] : no scalp tenderness, no temporal thickening

## 2023-12-20 NOTE — DATA REVIEWED
[FreeTextEntry1] : hospital records reviewed today with patient and family     Collected Date/Time:                   11/20/2023 18:20 EST Received Date/Time:                    11/21/2023 08:42 EST  Surgical Pathology Report - Auth (Verified)  Specimen(s) Submitted 1  Left temporal artery biopsy 2  Right temporal biopsy  Final Diagnosis  1. 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 There is air and postprocedural inflammation in the bilateral temporal soft tissues, compatible with recent temporal artery biopsies.  CTA BRAIN: Patent intracranial circulation. No flow-limiting stenosis or occlusion.  CTA NECK: Patent cervical vasculature. No flow limiting stenosis or occlusion.   IMPRESSION:  1.  Unremarkable examination of the orbits. 2.  Slight prominence to the vessels overlying the left temple without evidence of significant inflammatory change or juliana intraluminal filling defect.

## 2023-12-20 NOTE — ASSESSMENT
[FreeTextEntry1] :   # Intractable headaches Initial concern for GCA--- no response to steroids, no worsening symptoms since being off of steroids, no evidence of visual involvement on eye exam, negative bilateral TA biopsies overall doubting the diagnosis given above this was discussed at length with the patient's family, we reviewed the poor sensitivity of the TAB and the risk of visual loss in untreated GCA. they feel that the patient never responded to steroids and she has been felling the same since stopping them and would prefer to not resume steroids at this time. we discussed obtaining vascular imaging, her CTA and MR were reviewed today, she would need CTA chest and abdomen, alternatively a PET (if can be coordinated with hematology) , I left a message for hematologist  the current treatment for presumed migraine is also not providing her with relief, family is looking for another neurologist, information provided to them  will obtain ESR and CRP today   ## Anemia, of chronic disease?, constitutional symptoms   --outpatient lab work w/o evidence of hemolysis  No monoclonal protein on SPEP/ UPEP/IF  --imaging of the abdomen and pelvis ( done outpatient 4/23) w/o suspected neoplasm and/or bleed --advised to follow up with hematologist Dr Dos Santos of [facility] given constitutional symptoms   will discuss after above

## 2023-12-20 NOTE — HISTORY OF PRESENT ILLNESS
[FreeTextEntry1] :    The patient was seen and evaluated by the inpatient Rheumatology service at [facility]- November 2023  78, with a history of hypertension, hyperlipidemia, and dementia, presented to [facility] ED with worsening bitemporal/occipital headaches, 10 lbs weight loss, fatigue, forgetfulness, and dizziness over the past 2 months. Previously treated for suspected GCA with prednisone, the dosage was mistakenly increased to 80 mg for 3 days, then reduced to 20 mg which was stopped due to stomach pain. Repeat markers showed improvement, but Neurology evaluation 2 weeks ago revealed elevated inflammatory markers and new-onset blurry vision. Referred to ED for further assessment.  - B/l temporal headache for past 2 months with 10 lbs weight loss, fatigue, forgetfulness, and dizziness - On exam, there was scalp tenderness noted in the bitemporal and occipital regions, and carotodynia was observed on the left side of the neck. - The ophthalmology note was reviewed, patient reported vision changes, but the eye exam showed no CGA findings - ESR 75, CRP 19 - Neurology recommended to start methylprednisolone 1 g daily, decrease steroid to 500 mg daily for 2 more days ( 11/18 and 11/19). sent out with prednisone 50mg taper to decrease by 10mg qweek -s/p temporal artery biopsy. negative b/l -MRI shows slight prominence of the vessels. negative orbits  # Since discharge -had another admission for similar complaints -steroids were stopped and patient remains off steroids since -started on Atogepant by neurologist for migraines -labs 11/8/23 ESR 75, CRP 36.4 -patient reports no change in her headaches or other symptoms when was on steroids, no change since being off steroids    PMD Dr Vahe Miller Neuro Dr Vasyl Rendon opth Dr Ignacio Ross [phone]

## 2023-12-21 LAB
ALBUMIN SERPL ELPH-MCNC: 4.1 G/DL
ALP BLD-CCNC: 105 U/L
ALT SERPL-CCNC: 26 U/L
ANION GAP SERPL CALC-SCNC: 13 MMOL/L
AST SERPL-CCNC: 21 U/L
BASOPHILS # BLD AUTO: 0.02 K/UL
BASOPHILS NFR BLD AUTO: 0.3 %
BILIRUB SERPL-MCNC: 0.4 MG/DL
BUN SERPL-MCNC: 18 MG/DL
CALCIUM SERPL-MCNC: 10 MG/DL
CHLORIDE SERPL-SCNC: 103 MMOL/L
CO2 SERPL-SCNC: 25 MMOL/L
CREAT SERPL-MCNC: 0.89 MG/DL
CRP SERPL-MCNC: 27 MG/L
EGFR: 66 ML/MIN/1.73M2
EOSINOPHIL # BLD AUTO: 0.07 K/UL
EOSINOPHIL NFR BLD AUTO: 1.2 %
ERYTHROCYTE [SEDIMENTATION RATE] IN BLOOD BY WESTERGREN METHOD: 44 MM/HR
HCT VFR BLD CALC: 33.8 %
HGB BLD-MCNC: 10.8 G/DL
IMM GRANULOCYTES NFR BLD AUTO: 0.3 %
LYMPHOCYTES # BLD AUTO: 1.07 K/UL
LYMPHOCYTES NFR BLD AUTO: 18.7 %
MAN DIFF?: NORMAL
MCHC RBC-ENTMCNC: 31.7 PG
MCHC RBC-ENTMCNC: 32 GM/DL
MCV RBC AUTO: 99.1 FL
MONOCYTES # BLD AUTO: 0.53 K/UL
MONOCYTES NFR BLD AUTO: 9.2 %
NEUTROPHILS # BLD AUTO: 4.02 K/UL
NEUTROPHILS NFR BLD AUTO: 70.3 %
PLATELET # BLD AUTO: 276 K/UL
POTASSIUM SERPL-SCNC: 4.1 MMOL/L
PROT SERPL-MCNC: 6.3 G/DL
RBC # BLD: 3.41 M/UL
RBC # FLD: 16 %
SODIUM SERPL-SCNC: 142 MMOL/L
WBC # FLD AUTO: 5.73 K/UL

## 2024-06-03 RX ORDER — ROSUVASTATIN CALCIUM 5 MG/1
1 TABLET ORAL
Refills: 0 | DISCHARGE

## 2024-06-03 RX ORDER — PANTOPRAZOLE SODIUM 20 MG/1
1 TABLET, DELAYED RELEASE ORAL
Refills: 0 | DISCHARGE

## 2024-06-03 RX ORDER — LOSARTAN POTASSIUM 100 MG/1
1 TABLET, FILM COATED ORAL
Refills: 0 | DISCHARGE

## (undated) DEVICE — GLV 8 PROTEXIS (WHITE)

## (undated) DEVICE — SPECIMEN CONTAINER 100ML

## (undated) DEVICE — WARMING BLANKET LOWER ADULT

## (undated) DEVICE — PACK GENERAL MINOR

## (undated) DEVICE — DRAPE LIGHT HANDLE COVER (BLUE)

## (undated) DEVICE — DRSG OPSITE 13.75 X 4"

## (undated) DEVICE — SOL IRR POUR H2O 250ML

## (undated) DEVICE — DRAPE LAPAROTOMY PEDIATRIC

## (undated) DEVICE — PREP CHLORAPREP HI-LITE ORANGE 26ML

## (undated) DEVICE — DRAPE INSTRUMENT POUCH 6.75" X 11"

## (undated) DEVICE — GLV 7.5 PROTEXIS (WHITE)

## (undated) DEVICE — DRAPE TOWEL BLUE 17" X 24"

## (undated) DEVICE — POSITIONER FOAM EGG CRATE ULNAR 2PCS (PINK)

## (undated) DEVICE — SUT POLYSORB 3-0 30" V-20 UNDYED

## (undated) DEVICE — SUT SOFSILK 3-0 18" TIES

## (undated) DEVICE — MEDICATION LABELS W MARKER

## (undated) DEVICE — GLV 6.5 PROTEXIS (WHITE)

## (undated) DEVICE — GLV 8.5 PROTEXIS (WHITE)

## (undated) DEVICE — SUCTION YANKAUER NO CONTROL VENT

## (undated) DEVICE — VENODYNE/SCD SLEEVE CALF LARGE

## (undated) DEVICE — DRAPE 3/4 SHEET W REINFORCEMENT 56X77"

## (undated) DEVICE — SOL IRR POUR NS 0.9% 500ML

## (undated) DEVICE — DRAPE MAGNETIC INSTRUMENT MEDIUM

## (undated) DEVICE — DRSG STERISTRIPS 0.5 X 4"

## (undated) DEVICE — MARKING PEN W RULER

## (undated) DEVICE — DRAPE MINOR PROCEDURE

## (undated) DEVICE — SYR LUER LOK 10CC

## (undated) DEVICE — BLADE SCALPEL SAFETYLOCK #11

## (undated) DEVICE — DRSG TEGADERM 6"X8"

## (undated) DEVICE — STAPLER SKIN VISI-STAT 35 WIDE

## (undated) DEVICE — SUT SOFSILK 4-0 18" TIES

## (undated) DEVICE — GOWN XL

## (undated) DEVICE — BLADE SCALPEL SAFETYLOCK #15

## (undated) DEVICE — BLADE SCALPEL SAFETYLOCK #10

## (undated) DEVICE — GOWN TRIMAX LG

## (undated) DEVICE — DRAPE MAYO STAND 30"

## (undated) DEVICE — GLV 7 PROTEXIS (WHITE)

## (undated) DEVICE — NDL HYPO SAFE 25G X 5/8" (ORANGE)

## (undated) DEVICE — SUT BIOSYN 4-0 18" P-12

## (undated) DEVICE — DRAPE 1/2 SHEET 40X57"